# Patient Record
Sex: FEMALE | Race: WHITE | NOT HISPANIC OR LATINO | Employment: FULL TIME | URBAN - METROPOLITAN AREA
[De-identification: names, ages, dates, MRNs, and addresses within clinical notes are randomized per-mention and may not be internally consistent; named-entity substitution may affect disease eponyms.]

---

## 2017-07-22 LAB
AMPHETAMINES UR QL: NEGATIVE
BACTERIA SPEC AEROBE CULT: ABNORMAL
BUPRENORPHINE SERPL-MCNC: NEGATIVE NG/ML
CANNABINOIDS SERPL QL: NEGATIVE
COCAINE SERPL CFM-MCNC: NORMAL NG/ML
EXTERNAL ABO GROUPING: (no result)
EXTERNAL AMPHETAMINE SCREEN URINE: NEGATIVE
EXTERNAL ANTIBODY SCREEN: NORMAL
EXTERNAL BARBITURATE SCREEN URINE: NORMAL
EXTERNAL BENZODIAZEPINE SCREEN URINE: NEGATIVE
EXTERNAL HEMATOCRIT: 39 %
EXTERNAL HEMOGLOBIN: 12.8 G/DL
EXTERNAL HEPATITIS B SURFACE ANTIGEN: NEGATIVE
EXTERNAL METHADONE SCREEN URINE: NEGATIVE
EXTERNAL PHENCYCLIDINE SCREEN URINE: NORMAL
EXTERNAL PLATELET COUNT: 273 K/ΜL
EXTERNAL PROPOXYPHENE SCREEN URINE: NORMAL
EXTERNAL RH FACTOR: POSITIVE
EXTERNAL SYPHILIS RPR SCREEN: (no result)
GP B STREP RRNA SPEC QL PROBE: NORMAL
HIV 1+2 AB+HIV1 P24 AG SERPL QL IA: NORMAL
OPIATES UR QL: NEGATIVE
OXYCODONE UR QL SCN: NEGATIVE
RUBV IGG SERPL IA-ACNC: (no result)
TRICYCLICS UR QL SCN: NEGATIVE

## 2017-09-23 PROBLEM — Z01.419 WELL WOMAN EXAM: Status: ACTIVE | Noted: 2017-09-23

## 2017-09-28 ENCOUNTER — INITIAL PRENATAL (OUTPATIENT)
Dept: OBSTETRICS AND GYNECOLOGY | Facility: CLINIC | Age: 22
End: 2017-09-28

## 2017-09-28 VITALS — DIASTOLIC BLOOD PRESSURE: 72 MMHG | SYSTOLIC BLOOD PRESSURE: 108 MMHG | BODY MASS INDEX: 34.48 KG/M2 | WEIGHT: 204 LBS

## 2017-09-28 DIAGNOSIS — O09.92 HIGH-RISK PREGNANCY, SECOND TRIMESTER: Primary | ICD-10-CM

## 2017-09-28 DIAGNOSIS — O34.219 PREVIOUS CESAREAN SECTION COMPLICATING PREGNANCY: ICD-10-CM

## 2017-09-28 PROBLEM — Z87.761 HISTORY OF GASTROSCHISIS: Status: ACTIVE | Noted: 2017-09-28

## 2017-09-28 PROBLEM — O09.90 HIGH-RISK PREGNANCY: Status: ACTIVE | Noted: 2017-09-28

## 2017-09-28 PROCEDURE — 99214 OFFICE O/P EST MOD 30 MIN: CPT | Performed by: OBSTETRICS & GYNECOLOGY

## 2017-09-28 RX ORDER — PRENATAL VIT/IRON FUM/FOLIC AC 27MG-0.8MG
TABLET ORAL DAILY
COMMUNITY

## 2017-09-28 NOTE — PROGRESS NOTES
"Subjective   Chief Complaint   Patient presents with   • Initial Prenatal Visit       Lyssa Javier is a 21 y.o. year old .  Patient's last menstrual period was 2017 (approximate).  She presents to be seen to initiate prenatal care.     Smoking status: Never Smoker                                                              Smokeless status: Never Used                          The following portions of the patient's history were reviewed and updated as appropriate:{history reviewed:76519::\"vital signs\",\"allergies\",\"current medications\",\"past medical history\",\"past social history\",\"past surgical history\",\"problem list\"}.    Objective   Lab Review   {Review - gyn labs:88614::\"No data reviewed\"}    Imaging   {Review - imagin::\"No data reviewed\"}    Assessment/Plan   ASSESSMENT  1. 21 y.o. year old  at ***  2. {Pregnancy Imp:95367::\"Supervision of low risk pregnancy\"}    PLAN  1. The problem list for pregnancy was initiated today  2. Tests ordered today:  No orders of the defined types were placed in this encounter.    3. Testing for GC / Chlamydia / trichomonas {Actions; was done:15986::\"will need to be done at her next prenatal visit\"}  4. Genetic testing reviewed: {Genetic testing options:74314}  5. Information reviewed: {Pregnancy - first PN visit checklist:58195::\"exercise in pregnancy\",\"nutrition in pregnancy\",\"weight gain in pregnancy\",\"work and travel restrictions during pregnancy\",\"list of OTC medications acceptable in pregnancy\",\"call coverage groups\"}    Total time spent today with Lyssa  was {Time spent:36129}.  Off this time, {Percentage:96379::\"> 50%\"} was spent face-to-face time coordinating care, answering her questions and counseling regarding {Counseling topics:00387::\"pathophysiology of her presenting problem along with plans for any diagnositc work-up and treatment\"}.    Follow up: {numbers 1-12:} {DAYS, WEEKS, MONTHS, YEARS:24869}       This note was " electronically signed.    Roe Pollard MD  September 28, 2017

## 2017-09-28 NOTE — PROGRESS NOTES
Chief Complaint   Patient presents with   • Initial Prenatal Visit     Transferring care from [facility].  Just wasn't happy with the care she was receiving care.  Ultrasound has been done.  Labs of also been done.  Records are not available currently.  NT screening has been offered but was not able to be performed due to fetal position    HPI: Lyssa is a  currently at 17w6d who today reports the following:  Contractions - No; Leaking - No; Vaginal bleeding -  No; Heartburn - No.    ROS:  GI: Nausea - No ; Constipation - No; Diarrhea - No    Neuro: Headache - No ; Visual change - No      EXAM:  Vitals: See prenatal flowsheet   Abdomen: See prenatal flowsheet   Urine glucose/protein: See prenatal flowsheet   Pelvic: See prenatal flowsheet     No results found for: ABORH, ABO, RH, LABANTI, ABSCRN    MDM:  Impression: 1. Supervision of high risk pregnancy  2. Previous C/S with route of delivery to be determined  3. Prior gastroschisis   Tests done today: 1. MSAFP4 reviewed   Topics discussed: 1. Continue with PNV's  2. Prenatal labs reviewed  3. Need to get records from prior  along with prenatal records from current pregnancy   Tests scheduled today for her next visit:   Ultrasound for screening at    Next visit: See prenatal flowsheet

## 2017-10-23 ENCOUNTER — OFFICE VISIT (OUTPATIENT)
Dept: OBSTETRICS AND GYNECOLOGY | Facility: HOSPITAL | Age: 22
End: 2017-10-23

## 2017-10-23 ENCOUNTER — ROUTINE PRENATAL (OUTPATIENT)
Dept: OBSTETRICS AND GYNECOLOGY | Facility: CLINIC | Age: 22
End: 2017-10-23

## 2017-10-23 ENCOUNTER — HOSPITAL ENCOUNTER (OUTPATIENT)
Dept: WOMENS IMAGING | Facility: HOSPITAL | Age: 22
Discharge: HOME | End: 2017-10-23
Attending: OBSTETRICS & GYNECOLOGY | Admitting: OBSTETRICS & GYNECOLOGY

## 2017-10-23 VITALS — SYSTOLIC BLOOD PRESSURE: 128 MMHG | BODY MASS INDEX: 35.49 KG/M2 | WEIGHT: 210 LBS | DIASTOLIC BLOOD PRESSURE: 78 MMHG

## 2017-10-23 VITALS — WEIGHT: 209 LBS | DIASTOLIC BLOOD PRESSURE: 66 MMHG | BODY MASS INDEX: 35.32 KG/M2 | SYSTOLIC BLOOD PRESSURE: 132 MMHG

## 2017-10-23 DIAGNOSIS — O44.42 LOW LYING PLACENTA NOS OR WITHOUT HEMORRHAGE, SECOND TRIMESTER: ICD-10-CM

## 2017-10-23 DIAGNOSIS — O34.219 PREVIOUS CESAREAN SECTION COMPLICATING PREGNANCY: ICD-10-CM

## 2017-10-23 DIAGNOSIS — O09.92 HIGH-RISK PREGNANCY IN SECOND TRIMESTER: ICD-10-CM

## 2017-10-23 DIAGNOSIS — Z87.761 HISTORY OF GASTROSCHISIS: ICD-10-CM

## 2017-10-23 DIAGNOSIS — O09.92 HIGH-RISK PREGNANCY, SECOND TRIMESTER: ICD-10-CM

## 2017-10-23 DIAGNOSIS — O09.92 HIGH-RISK PREGNANCY IN SECOND TRIMESTER: Primary | ICD-10-CM

## 2017-10-23 PROCEDURE — 76811 OB US DETAILED SNGL FETUS: CPT

## 2017-10-23 PROCEDURE — 99213 OFFICE O/P EST LOW 20 MIN: CPT | Performed by: OBSTETRICS & GYNECOLOGY

## 2017-10-23 PROCEDURE — 76811 OB US DETAILED SNGL FETUS: CPT | Performed by: OBSTETRICS & GYNECOLOGY

## 2017-10-23 NOTE — PROGRESS NOTES
Previous child with gastroschisis, delivered at .  To see Dr. Pollard today after PDC.  No NIPT done.

## 2017-10-23 NOTE — PROGRESS NOTES
Chief Complaint   Patient presents with   • Routine Prenatal Visit       HPI: Lyssa is a  currently at 21w3d who today reports the following:  Contractions - No; Leaking - No; Vaginal bleeding -  No; Heartburn - No.  Records from [facility] still not here    ROS:  GI: Nausea - No ; Constipation - No; Diarrhea - No    Neuro: Headache - No ; Visual change - No      EXAM:  Vitals: See prenatal flowsheet   Abdomen: See prenatal flowsheet   Urine glucose/protein: See prenatal flowsheet   Pelvic: See prenatal flowsheet     No results found for: ABORH, ABO, RH, LABANTI, ABSCRN    MDM:  Impression: 1. Supervision of high risk pregnancy  2. Previous C/S with route of delivery to be determined  3. Low-lying placenta   Tests done today: 1. U/S for anatomic screening - at [facility]   Topics discussed: 1. Continue with PNV's  2. Prenatal labs reviewed  3. low-lying placenta   Tests scheduled today for her next visit:   none   Next visit: See prenatal flowsheet

## 2017-10-23 NOTE — PROGRESS NOTES
Documentation of the ultrasound findings, images, and interpretations will be available in the patient's ViewPoint report located in the chart review imaging tab in [facility].

## 2017-11-09 PROBLEM — R82.71 GROUP B STREPTOCOCCAL BACTERIURIA: Status: ACTIVE | Noted: 2017-11-09

## 2017-11-10 ENCOUNTER — DOCUMENTATION (OUTPATIENT)
Dept: OBSTETRICS AND GYNECOLOGY | Facility: CLINIC | Age: 22
End: 2017-11-10

## 2017-11-10 NOTE — PROGRESS NOTES
Operative note from [facility] dated 2001 reviewed.  She had a low transverse  section with 2 layer closure.

## 2017-11-20 ENCOUNTER — ROUTINE PRENATAL (OUTPATIENT)
Dept: OBSTETRICS AND GYNECOLOGY | Facility: CLINIC | Age: 22
End: 2017-11-20

## 2017-11-20 VITALS — WEIGHT: 211 LBS | BODY MASS INDEX: 35.66 KG/M2 | DIASTOLIC BLOOD PRESSURE: 74 MMHG | SYSTOLIC BLOOD PRESSURE: 122 MMHG

## 2017-11-20 DIAGNOSIS — R82.71 GROUP B STREPTOCOCCAL BACTERIURIA: ICD-10-CM

## 2017-11-20 DIAGNOSIS — O44.42 LOW LYING PLACENTA NOS OR WITHOUT HEMORRHAGE, SECOND TRIMESTER: ICD-10-CM

## 2017-11-20 DIAGNOSIS — O34.219 PREVIOUS CESAREAN SECTION COMPLICATING PREGNANCY: ICD-10-CM

## 2017-11-20 DIAGNOSIS — O09.92 HIGH-RISK PREGNANCY IN SECOND TRIMESTER: Primary | ICD-10-CM

## 2017-11-20 PROBLEM — Z30.2 REQUEST FOR STERILIZATION: Status: ACTIVE | Noted: 2017-11-20

## 2017-11-20 PROCEDURE — 99213 OFFICE O/P EST LOW 20 MIN: CPT | Performed by: OBSTETRICS & GYNECOLOGY

## 2017-11-20 RX ORDER — PROMETHAZINE HYDROCHLORIDE 25 MG/ML
12.5 INJECTION, SOLUTION INTRAMUSCULAR; INTRAVENOUS EVERY 6 HOURS PRN
Status: CANCELLED | OUTPATIENT
Start: 2017-11-20

## 2017-11-20 RX ORDER — SODIUM CHLORIDE 0.9 % (FLUSH) 0.9 %
1-10 SYRINGE (ML) INJECTION AS NEEDED
Status: CANCELLED | OUTPATIENT
Start: 2017-11-20

## 2017-11-20 RX ORDER — TRISODIUM CITRATE DIHYDRATE AND CITRIC ACID MONOHYDRATE 500; 334 MG/5ML; MG/5ML
30 SOLUTION ORAL ONCE
Status: CANCELLED | OUTPATIENT
Start: 2017-11-20 | End: 2017-11-20

## 2017-11-20 RX ORDER — MISOPROSTOL 100 UG/1
800 TABLET ORAL AS NEEDED
Status: CANCELLED | OUTPATIENT
Start: 2017-11-20

## 2017-11-20 RX ORDER — SODIUM CHLORIDE, SODIUM LACTATE, POTASSIUM CHLORIDE, CALCIUM CHLORIDE 600; 310; 30; 20 MG/100ML; MG/100ML; MG/100ML; MG/100ML
125 INJECTION, SOLUTION INTRAVENOUS CONTINUOUS
Status: CANCELLED | OUTPATIENT
Start: 2017-11-20

## 2017-11-20 RX ORDER — PROMETHAZINE HYDROCHLORIDE 25 MG/1
12.5 TABLET ORAL EVERY 6 HOURS PRN
Status: CANCELLED | OUTPATIENT
Start: 2017-11-20

## 2017-11-20 RX ORDER — PROMETHAZINE HYDROCHLORIDE 25 MG/1
12.5 SUPPOSITORY RECTAL EVERY 6 HOURS PRN
Status: CANCELLED | OUTPATIENT
Start: 2017-11-20

## 2017-11-20 RX ORDER — METHYLERGONOVINE MALEATE 0.2 MG/ML
200 INJECTION INTRAVENOUS ONCE AS NEEDED
Status: CANCELLED | OUTPATIENT
Start: 2017-11-20

## 2017-11-20 RX ORDER — CARBOPROST TROMETHAMINE 250 UG/ML
250 INJECTION, SOLUTION INTRAMUSCULAR AS NEEDED
Status: CANCELLED | OUTPATIENT
Start: 2017-11-20

## 2017-11-20 RX ORDER — LIDOCAINE HYDROCHLORIDE 10 MG/ML
5 INJECTION, SOLUTION INFILTRATION; PERINEURAL AS NEEDED
Status: CANCELLED | OUTPATIENT
Start: 2017-11-20

## 2017-11-20 NOTE — PROGRESS NOTES
Chief Complaint   Patient presents with   • Routine Prenatal Visit       HPI: Lyssa is a  currently at 25w3d who today reports the following:  Contractions - No; Leaking - No; Vaginal bleeding -  No; Heartburn - No.  She has considered delivery options and wants to deliver by repeat C/S    ROS:  GI: Nausea - No ; Constipation - No; Diarrhea - No    Neuro: Headache - No ; Visual change - No      EXAM:  Vitals: See prenatal flowsheet   Abdomen: See prenatal flowsheet   Urine glucose/protein: See prenatal flowsheet   Pelvic: See prenatal flowsheet     Lab Results   Component Value Date    ABO O 2017    RH Positive 2017    ABSCRN Normal 2017       MDM:  Impression: 1. Supervision of high risk pregnancy  2. Previous C/S with planned repeat C/S  3. Low-lying placenta   Tests done today: 1. none   Topics discussed: 1. Continue with PNV's  2. Prenatal labs reviewed  3. flu vaccine   4. Call for C/S date 39-41 weeks   Tests scheduled today for her next visit:   GCT  HgB   Next visit: See prenatal flowsheet

## 2017-11-27 ENCOUNTER — DOCUMENTATION (OUTPATIENT)
Dept: OBSTETRICS AND GYNECOLOGY | Facility: CLINIC | Age: 22
End: 2017-11-27

## 2017-12-11 ENCOUNTER — TELEPHONE (OUTPATIENT)
Dept: OBSTETRICS AND GYNECOLOGY | Facility: CLINIC | Age: 22
End: 2017-12-11

## 2017-12-11 ENCOUNTER — ROUTINE PRENATAL (OUTPATIENT)
Dept: OBSTETRICS AND GYNECOLOGY | Facility: CLINIC | Age: 22
End: 2017-12-11

## 2017-12-11 ENCOUNTER — APPOINTMENT (OUTPATIENT)
Dept: LAB | Facility: HOSPITAL | Age: 22
End: 2017-12-11

## 2017-12-11 VITALS — SYSTOLIC BLOOD PRESSURE: 118 MMHG | WEIGHT: 211 LBS | BODY MASS INDEX: 35.66 KG/M2 | DIASTOLIC BLOOD PRESSURE: 74 MMHG

## 2017-12-11 DIAGNOSIS — O44.42 LOW LYING PLACENTA NOS OR WITHOUT HEMORRHAGE, SECOND TRIMESTER: ICD-10-CM

## 2017-12-11 DIAGNOSIS — O34.219 PREVIOUS CESAREAN SECTION COMPLICATING PREGNANCY: ICD-10-CM

## 2017-12-11 DIAGNOSIS — O99.810 ABNORMAL O'SULLIVAN GLUCOSE CHALLENGE TEST, ANTEPARTUM: Primary | ICD-10-CM

## 2017-12-11 DIAGNOSIS — Z30.2 REQUEST FOR STERILIZATION: ICD-10-CM

## 2017-12-11 DIAGNOSIS — R82.71 GROUP B STREPTOCOCCAL BACTERIURIA: ICD-10-CM

## 2017-12-11 DIAGNOSIS — O09.92 HIGH-RISK PREGNANCY IN SECOND TRIMESTER: ICD-10-CM

## 2017-12-11 LAB
GLUCOSE 1H P 100 G GLC PO SERPL-MCNC: 155 MG/DL (ref 65–199)
HCT VFR BLD AUTO: 36.4 % (ref 34.5–44)
HGB BLD-MCNC: 12.1 G/DL (ref 11.5–15.5)

## 2017-12-11 PROCEDURE — 85018 HEMOGLOBIN: CPT | Performed by: OBSTETRICS & GYNECOLOGY

## 2017-12-11 PROCEDURE — 82950 GLUCOSE TEST: CPT | Performed by: OBSTETRICS & GYNECOLOGY

## 2017-12-11 PROCEDURE — 85014 HEMATOCRIT: CPT | Performed by: OBSTETRICS & GYNECOLOGY

## 2017-12-11 PROCEDURE — 36415 COLL VENOUS BLD VENIPUNCTURE: CPT | Performed by: OBSTETRICS & GYNECOLOGY

## 2017-12-11 PROCEDURE — 99213 OFFICE O/P EST LOW 20 MIN: CPT | Performed by: OBSTETRICS & GYNECOLOGY

## 2017-12-11 NOTE — PROGRESS NOTES
Chief Complaint   Patient presents with   • Routine Prenatal Visit       HPI: Lyssa is a  currently at 28w3d who today reports the following:  Contractions - No; Leaking - No; Vaginal bleeding -  No; Heartburn - No.  Also she's having intermittent low back pain.  It is not impacting her at this point.    ROS:  GI: Nausea - No ; Constipation - No; Diarrhea - No    Neuro: Headache - No ; Visual change - No      EXAM:  Vitals: See prenatal flowsheet   Abdomen: See prenatal flowsheet   Urine glucose/protein: See prenatal flowsheet   Pelvic: See prenatal flowsheet     Lab Results   Component Value Date    ABO O 2017    RH Positive 2017    ABSCRN Normal 2017       MDM:  Impression: 1. Supervision of high risk pregnancy  2. Previous C/S with planned repeat C/S  3. Low-lying placenta   4. Lumbago   Tests done today: 1. GCT  2. HgB   Topics discussed: 1. Continue with PNV's  2. Prenatal labs reviewed  3. Exercises for low back pain in pregnancy   Tests scheduled today for her next visit:   none   Next visit: See prenatal flowsheet     Fetal movement counting was discussed.  I explained to Lyssa that if she is noticing diminished quantity of fetal movement, she should eat or drink something and lay down on her side for 30 minutes.  So long as there are 3 movements in the 30 minute window, fetal movement is normal.  She should do this twice daily if fetal movement is perceived to be diminished.  If ever she gets less than 3 movements in a 30 minute window, she is to call the office immediately for further fetal testing.

## 2017-12-13 ENCOUNTER — RESULTS ENCOUNTER (OUTPATIENT)
Dept: OBSTETRICS AND GYNECOLOGY | Facility: CLINIC | Age: 22
End: 2017-12-13

## 2017-12-13 DIAGNOSIS — O09.92 HIGH-RISK PREGNANCY IN SECOND TRIMESTER: ICD-10-CM

## 2017-12-27 ENCOUNTER — LAB (OUTPATIENT)
Dept: LAB | Facility: HOSPITAL | Age: 22
End: 2017-12-27

## 2017-12-27 DIAGNOSIS — O99.810 ABNORMAL O'SULLIVAN GLUCOSE CHALLENGE TEST, ANTEPARTUM: ICD-10-CM

## 2017-12-27 DIAGNOSIS — O99.810 ABNORMAL MATERNAL GLUCOSE TOLERANCE, ANTEPARTUM: Primary | ICD-10-CM

## 2017-12-27 DIAGNOSIS — O24.410 DIET CONTROLLED GESTATIONAL DIABETES MELLITUS (GDM) IN THIRD TRIMESTER: Primary | ICD-10-CM

## 2017-12-27 LAB
GLUCOSE BLDC GLUCOMTR-MCNC: 120 MG/DL
GLUCOSE BLDC-MCNC: 94 MG/DL (ref 75–125)
GLUCOSE P FAST SERPL-MCNC: 93 MG/DL (ref 65–95)
GTT GEST 2H PNL UR+SERPL: 198 MG/DL (ref 65–180)
GTT GEST 3H PNL SERPL: 115 MG/DL (ref 65–140)
GTT GEST 3H PNL SERPL: 162 MG/DL (ref 65–155)

## 2017-12-27 PROCEDURE — 36415 COLL VENOUS BLD VENIPUNCTURE: CPT

## 2017-12-27 PROCEDURE — 82962 GLUCOSE BLOOD TEST: CPT

## 2017-12-27 PROCEDURE — 82952 GTT-ADDED SAMPLES: CPT

## 2017-12-27 PROCEDURE — 82951 GLUCOSE TOLERANCE TEST (GTT): CPT

## 2017-12-28 ENCOUNTER — TELEPHONE (OUTPATIENT)
Dept: OBSTETRICS AND GYNECOLOGY | Facility: CLINIC | Age: 22
End: 2017-12-28

## 2017-12-28 NOTE — TELEPHONE ENCOUNTER
Diabetes edu called to clarify diabetes order. Originally order was written just for Nutrition, If so, then she would only see dietitian. If you want her to see the nurse as well, a new order has to be written for diabetes services and sent over.

## 2017-12-29 ENCOUNTER — ROUTINE PRENATAL (OUTPATIENT)
Dept: OBSTETRICS AND GYNECOLOGY | Facility: CLINIC | Age: 22
End: 2017-12-29

## 2017-12-29 VITALS — BODY MASS INDEX: 35.66 KG/M2 | WEIGHT: 211 LBS | SYSTOLIC BLOOD PRESSURE: 116 MMHG | DIASTOLIC BLOOD PRESSURE: 72 MMHG

## 2017-12-29 DIAGNOSIS — O24.419 GESTATIONAL DIABETES MELLITUS (GDM) AFFECTING PREGNANCY: Primary | ICD-10-CM

## 2017-12-29 DIAGNOSIS — O09.93 HIGH-RISK PREGNANCY IN THIRD TRIMESTER: Primary | ICD-10-CM

## 2017-12-29 DIAGNOSIS — O24.415 GESTATIONAL DIABETES MELLITUS (GDM) IN SECOND TRIMESTER CONTROLLED ON ORAL HYPOGLYCEMIC DRUG: ICD-10-CM

## 2017-12-29 DIAGNOSIS — Z30.2 REQUEST FOR STERILIZATION: ICD-10-CM

## 2017-12-29 DIAGNOSIS — R82.71 GROUP B STREPTOCOCCAL BACTERIURIA: ICD-10-CM

## 2017-12-29 DIAGNOSIS — O44.42 LOW LYING PLACENTA NOS OR WITHOUT HEMORRHAGE, SECOND TRIMESTER: ICD-10-CM

## 2017-12-29 DIAGNOSIS — O24.414 INSULIN CONTROLLED GESTATIONAL DIABETES MELLITUS (GDM) IN SECOND TRIMESTER: Primary | ICD-10-CM

## 2017-12-29 DIAGNOSIS — O34.219 PREVIOUS CESAREAN SECTION COMPLICATING PREGNANCY: ICD-10-CM

## 2017-12-29 DIAGNOSIS — O24.410 DIET CONTROLLED GESTATIONAL DIABETES MELLITUS (GDM) IN THIRD TRIMESTER: ICD-10-CM

## 2017-12-29 PROCEDURE — 99213 OFFICE O/P EST LOW 20 MIN: CPT | Performed by: OBSTETRICS & GYNECOLOGY

## 2017-12-29 NOTE — PROGRESS NOTES
Chief Complaint   Patient presents with   • Routine Prenatal Visit       HPI: Lyssa is a  currently at 31w0d who today reports the following:  Contractions - No; Leaking - No; Vaginal bleeding -  No; Swelling of extremities - No.    ROS:  GI: Nausea - No; Constipation - No; Diarrhea - No    Neuro: Headache - No; Visual change - No      EXAM:  Vitals: See prenatal flowsheet   Abdomen: See prenatal flowsheet   Urine glucose/protein: See prenatal flowsheet   Pelvic: See prenatal flowsheet   MDM:   Impression: 1. Supervision of high risk pregnancy  2. Previous C/S with planned repeat C/S  3. Glucose intolerance   Tests done today: 1. none   Topics discussed: 1. Continue with PNV's  2. Prenatal labs reviewed  3. Need for dietician   Tests scheduled today for her next visit:   U/S for placenta recheck   Next visit: See prenatal flowsheet

## 2018-01-03 DIAGNOSIS — O41.03X0 OLIGOHYDRAMNIOS IN THIRD TRIMESTER, SINGLE OR UNSPECIFIED FETUS: Primary | ICD-10-CM

## 2018-01-03 PROBLEM — O44.42 LOW LYING PLACENTA NOS OR WITHOUT HEMORRHAGE, SECOND TRIMESTER: Status: RESOLVED | Noted: 2017-10-23 | Resolved: 2018-01-03

## 2018-01-12 ENCOUNTER — ROUTINE PRENATAL (OUTPATIENT)
Dept: OBSTETRICS AND GYNECOLOGY | Facility: CLINIC | Age: 23
End: 2018-01-12

## 2018-01-12 VITALS — BODY MASS INDEX: 36.5 KG/M2 | WEIGHT: 216 LBS | SYSTOLIC BLOOD PRESSURE: 114 MMHG | DIASTOLIC BLOOD PRESSURE: 72 MMHG

## 2018-01-12 DIAGNOSIS — O09.93 HIGH-RISK PREGNANCY IN THIRD TRIMESTER: ICD-10-CM

## 2018-01-12 DIAGNOSIS — R82.71 GROUP B STREPTOCOCCAL BACTERIURIA: ICD-10-CM

## 2018-01-12 DIAGNOSIS — O24.410 DIET CONTROLLED GESTATIONAL DIABETES MELLITUS (GDM) IN THIRD TRIMESTER: ICD-10-CM

## 2018-01-12 DIAGNOSIS — O34.219 PREVIOUS CESAREAN SECTION COMPLICATING PREGNANCY: ICD-10-CM

## 2018-01-12 DIAGNOSIS — Z30.2 REQUEST FOR STERILIZATION: ICD-10-CM

## 2018-01-12 PROBLEM — O41.03X0 OLIGOHYDRAMNIOS IN THIRD TRIMESTER: Status: RESOLVED | Noted: 2018-01-03 | Resolved: 2018-01-12

## 2018-01-12 PROCEDURE — 99213 OFFICE O/P EST LOW 20 MIN: CPT | Performed by: OBSTETRICS & GYNECOLOGY

## 2018-01-12 NOTE — PROGRESS NOTES
Chief Complaint   Patient presents with   • Routine Prenatal Visit       HPI: Lyssa is a  currently at 33w0d who today reports the following:  Contractions - No; Leaking - No; Vaginal bleeding -  No; Swelling of extremities - No.    ROS:  GI: Nausea - No; Constipation - No; Diarrhea - No    Neuro: Headache - No; Visual change - No      EXAM:  Vitals: See prenatal flowsheet   Abdomen: See prenatal flowsheet   Urine glucose/protein: See prenatal flowsheet   Pelvic: See prenatal flowsheet   MDM:   Impression: 1. Supervision of high risk pregnancy  2. Previous C/S with planned repeat C/S  3. Oligohydramnios - resolved   Tests done today: 1. U/S for JUDSON only - 10.1   Topics discussed: 1. Continue with PNV's  2. Prenatal labs reviewed  3. none   Tests scheduled today for her next visit:   none   Next visit: See prenatal flowsheet

## 2018-01-26 ENCOUNTER — ROUTINE PRENATAL (OUTPATIENT)
Dept: OBSTETRICS AND GYNECOLOGY | Facility: CLINIC | Age: 23
End: 2018-01-26

## 2018-01-26 VITALS — SYSTOLIC BLOOD PRESSURE: 116 MMHG | DIASTOLIC BLOOD PRESSURE: 74 MMHG | BODY MASS INDEX: 36 KG/M2 | WEIGHT: 213 LBS

## 2018-01-26 DIAGNOSIS — O24.410 DIET CONTROLLED GESTATIONAL DIABETES MELLITUS (GDM) IN THIRD TRIMESTER: ICD-10-CM

## 2018-01-26 DIAGNOSIS — Z30.2 REQUEST FOR STERILIZATION: ICD-10-CM

## 2018-01-26 DIAGNOSIS — O34.219 PREVIOUS CESAREAN SECTION COMPLICATING PREGNANCY: ICD-10-CM

## 2018-01-26 DIAGNOSIS — O09.93 HIGH-RISK PREGNANCY IN THIRD TRIMESTER: ICD-10-CM

## 2018-01-26 DIAGNOSIS — R82.71 GROUP B STREPTOCOCCAL BACTERIURIA: ICD-10-CM

## 2018-01-26 PROCEDURE — 99213 OFFICE O/P EST LOW 20 MIN: CPT | Performed by: OBSTETRICS & GYNECOLOGY

## 2018-01-26 NOTE — PROGRESS NOTES
Chief Complaint   Patient presents with   • Routine Prenatal Visit       HPI: Lyssa is a  currently at 35w0d who today reports the following:  Contractions - No; Leaking - No; Vaginal bleeding -  No; Swelling of extremities - No.    ROS:  GI: Nausea - No; Constipation - No; Diarrhea - No    Neuro: Headache - No; Visual change - No      EXAM:  Vitals: See prenatal flowsheet   Abdomen: See prenatal flowsheet   Urine glucose/protein: See prenatal flowsheet   Pelvic: See prenatal flowsheet   MDM:   Impression: 1. Supervision of high risk pregnancy  2. DM - GDMA1  3. GBS (+)  4. Previous C/S with planned repeat C/S   Tests done today: 1. none   Topics discussed: 1. Continue with PNV's  2. Prenatal labs reviewed  3. none - she had no major complaints,questions or concerns   Tests scheduled today for her next visit:   U/S for EFW   Next visit: See prenatal flowsheet

## 2018-01-31 ENCOUNTER — ROUTINE PRENATAL (OUTPATIENT)
Dept: OBSTETRICS AND GYNECOLOGY | Facility: CLINIC | Age: 23
End: 2018-01-31

## 2018-01-31 VITALS — DIASTOLIC BLOOD PRESSURE: 74 MMHG | SYSTOLIC BLOOD PRESSURE: 118 MMHG | WEIGHT: 219 LBS | BODY MASS INDEX: 37.01 KG/M2

## 2018-01-31 DIAGNOSIS — O41.03X0 OLIGOHYDRAMNIOS IN THIRD TRIMESTER, SINGLE OR UNSPECIFIED FETUS: ICD-10-CM

## 2018-01-31 DIAGNOSIS — O09.93 HIGH-RISK PREGNANCY IN THIRD TRIMESTER: Primary | ICD-10-CM

## 2018-01-31 DIAGNOSIS — R82.71 GROUP B STREPTOCOCCAL BACTERIURIA: ICD-10-CM

## 2018-01-31 DIAGNOSIS — O24.410 DIET CONTROLLED GESTATIONAL DIABETES MELLITUS (GDM) IN THIRD TRIMESTER: ICD-10-CM

## 2018-01-31 DIAGNOSIS — O34.219 PREVIOUS CESAREAN SECTION COMPLICATING PREGNANCY: ICD-10-CM

## 2018-01-31 DIAGNOSIS — Z30.2 REQUEST FOR STERILIZATION: ICD-10-CM

## 2018-01-31 PROCEDURE — 99213 OFFICE O/P EST LOW 20 MIN: CPT | Performed by: OBSTETRICS & GYNECOLOGY

## 2018-01-31 NOTE — PROGRESS NOTES
Chief Complaint   Patient presents with   • Routine Prenatal Visit       HPI: Lyssa is a  currently at 35w5d who today reports the following:  Contractions - No; Leaking - No; Vaginal bleeding -  No; Swelling of extremities - No.    ROS:  GI: Nausea - No; Constipation - No; Diarrhea - No    Neuro: Headache - No; Visual change - No      EXAM:  Vitals: See prenatal flowsheet   Abdomen: See prenatal flowsheet   Urine glucose/protein: See prenatal flowsheet   Pelvic: See prenatal flowsheet   MDM:   Impression: 1. Supervision of high risk pregnancy  2. DM - GDMA1  3. Previous C/S with planned repeat C/S  4. Borderline low JUDSON (but normal MVP)   Tests done today: 1. U/S for EFW - 16%'ile   Topics discussed: 1. Continue with PNV's  2. Prenatal labs reviewed  3. Forced fluids and recheck JUDSON in 48 hours   Tests scheduled today for her next visit:   U/S for JUDSON only   Next visit: See prenatal flowsheet

## 2018-02-02 ENCOUNTER — ROUTINE PRENATAL (OUTPATIENT)
Dept: OBSTETRICS AND GYNECOLOGY | Facility: CLINIC | Age: 23
End: 2018-02-02

## 2018-02-02 VITALS — DIASTOLIC BLOOD PRESSURE: 70 MMHG | SYSTOLIC BLOOD PRESSURE: 116 MMHG | WEIGHT: 219 LBS | BODY MASS INDEX: 37.01 KG/M2

## 2018-02-02 DIAGNOSIS — O34.219 PREVIOUS CESAREAN SECTION COMPLICATING PREGNANCY: ICD-10-CM

## 2018-02-02 DIAGNOSIS — O41.03X0 OLIGOHYDRAMNIOS IN THIRD TRIMESTER, SINGLE OR UNSPECIFIED FETUS: Primary | ICD-10-CM

## 2018-02-02 PROCEDURE — 99214 OFFICE O/P EST MOD 30 MIN: CPT | Performed by: OBSTETRICS & GYNECOLOGY

## 2018-02-02 PROCEDURE — 59025 FETAL NON-STRESS TEST: CPT | Performed by: OBSTETRICS & GYNECOLOGY

## 2018-02-02 NOTE — PROGRESS NOTES
Chief Complaint   Patient presents with   • Routine Prenatal Visit     nst today, judson done today       HPI: Lyssa is a  currently at 36w0d who today reports the following:She has oligohydramnios and a small for gestational age baby.  She is a repeat  scheduled for 39+ weeks.  Of note her first child weighed 5 lbs. 11 oz. at 36-2/7 weeks.  Estimated fetal weight 3 days ago was 5 lbs. 1 oz.  Very good fetal movement noted.  Contractions - No; Leaking - YES she thinks she may be leaking a bit at or more just moisture is noted.  No gush of fluid.  Vaginal bleeding -  No; Swelling of extremities - YES.    ROS:  GI: Nausea - No; Constipation - No; Diarrhea - No    Neuro: Headache - No; Visual change - No      EXAM:  Vitals: See prenatal flowsheet   Abdomen: See prenatal flowsheet   Urine glucose/protein: See prenatal flowsheet   Pelvic: Sterile speculum cervix is closed little bit of mucous at the os.  No Valsalva fluid pH normal white vaginal discharge    MDM:   Impression: 1. Supervision of high risk pregnancy  2. Previous C/S with planned repeat C/S  3. Oligohydramnios and small for gestational age   Tests done today: 1. NST - reactive  2. U/S for JUDSON only - JUDSON 4.5   Topics discussed: 1. Continue with PNV's  2. Prenatal labs reviewed  3. Fetal movement and kick count chart given   4. Probably delivery sooner than 39-1/2 weeks given smaller baby right now very reassuring NST despite low fluid constitutionally smaller child.  She is asked to continue to force fluids.  Any decreased fetal movement gush of fluid or labor she is to come to labor and delivery without calling me on call this weekend    Tests scheduled today for her next visit:   NST  U/S for JUDSON only with Dr. Pollard Monday or Tuesday    Next visit: See prenatal flowsheet

## 2018-02-03 ENCOUNTER — HOSPITAL ENCOUNTER (INPATIENT)
Facility: HOSPITAL | Age: 23
LOS: 4 days | Discharge: HOME | End: 2018-02-07
Attending: OBSTETRICS & GYNECOLOGY | Admitting: OBSTETRICS & GYNECOLOGY

## 2018-02-03 DIAGNOSIS — O41.03X0 OLIGOHYDRAMNIOS IN THIRD TRIMESTER, SINGLE OR UNSPECIFIED FETUS: ICD-10-CM

## 2018-02-03 DIAGNOSIS — Z3A.36 PREGNANCY WITH 36 COMPLETED WEEKS GESTATION: ICD-10-CM

## 2018-02-03 DIAGNOSIS — O34.219 PREVIOUS CESAREAN SECTION COMPLICATING PREGNANCY: ICD-10-CM

## 2018-02-03 DIAGNOSIS — Z98.890 POST-OPERATIVE STATE: ICD-10-CM

## 2018-02-03 DIAGNOSIS — Z30.2 REQUEST FOR STERILIZATION: ICD-10-CM

## 2018-02-03 DIAGNOSIS — O09.92 HIGH-RISK PREGNANCY IN SECOND TRIMESTER: ICD-10-CM

## 2018-02-03 DIAGNOSIS — O41.03X0 OLIGOHYDRAMNIOS ANTEPARTUM, THIRD TRIMESTER, NOT APPLICABLE OR UNSPECIFIED FETUS: ICD-10-CM

## 2018-02-03 LAB
ABO GROUP BLD: NORMAL
BLD GP AB SCN SERPL QL: NEGATIVE
DEPRECATED RDW RBC AUTO: 42.3 FL (ref 37–54)
ERYTHROCYTE [DISTWIDTH] IN BLOOD BY AUTOMATED COUNT: 13.6 % (ref 11.3–14.5)
HCT VFR BLD AUTO: 34.7 % (ref 34.5–44)
HGB BLD-MCNC: 11.5 G/DL (ref 11.5–15.5)
MCH RBC QN AUTO: 28.5 PG (ref 27–31)
MCHC RBC AUTO-ENTMCNC: 33.1 G/DL (ref 32–36)
MCV RBC AUTO: 85.9 FL (ref 80–99)
PLATELET # BLD AUTO: 245 10*3/MM3 (ref 150–450)
PMV BLD AUTO: 10.1 FL (ref 6–12)
RBC # BLD AUTO: 4.04 10*6/MM3 (ref 3.89–5.14)
RH BLD: POSITIVE
WBC NRBC COR # BLD: 9.89 10*3/MM3 (ref 3.5–10.8)

## 2018-02-03 PROCEDURE — 25010000002 BETAMETHASONE ACET & SOD PHOS PER 4 MG: Performed by: OBSTETRICS & GYNECOLOGY

## 2018-02-03 PROCEDURE — 86901 BLOOD TYPING SEROLOGIC RH(D): CPT | Performed by: OBSTETRICS & GYNECOLOGY

## 2018-02-03 PROCEDURE — 86900 BLOOD TYPING SEROLOGIC ABO: CPT | Performed by: OBSTETRICS & GYNECOLOGY

## 2018-02-03 PROCEDURE — 86850 RBC ANTIBODY SCREEN: CPT | Performed by: OBSTETRICS & GYNECOLOGY

## 2018-02-03 PROCEDURE — 85027 COMPLETE CBC AUTOMATED: CPT | Performed by: OBSTETRICS & GYNECOLOGY

## 2018-02-03 PROCEDURE — 59025 FETAL NON-STRESS TEST: CPT

## 2018-02-03 RX ORDER — NALOXONE HCL 0.4 MG/ML
0.4 VIAL (ML) INJECTION
Status: DISCONTINUED | OUTPATIENT
Start: 2018-02-03 | End: 2018-02-04 | Stop reason: HOSPADM

## 2018-02-03 RX ORDER — SODIUM CHLORIDE 0.9 % (FLUSH) 0.9 %
1-10 SYRINGE (ML) INJECTION AS NEEDED
Status: CANCELLED | OUTPATIENT
Start: 2018-02-03

## 2018-02-03 RX ORDER — DIPHENHYDRAMINE HYDROCHLORIDE 50 MG/ML
25 INJECTION INTRAMUSCULAR; INTRAVENOUS NIGHTLY PRN
Status: CANCELLED | OUTPATIENT
Start: 2018-02-03

## 2018-02-03 RX ORDER — LIDOCAINE HYDROCHLORIDE 10 MG/ML
5 INJECTION, SOLUTION INFILTRATION; PERINEURAL AS NEEDED
Status: CANCELLED | OUTPATIENT
Start: 2018-02-03

## 2018-02-03 RX ORDER — ACETAMINOPHEN 500 MG
1000 TABLET ORAL EVERY 4 HOURS PRN
Status: DISCONTINUED | OUTPATIENT
Start: 2018-02-03 | End: 2018-02-04 | Stop reason: HOSPADM

## 2018-02-03 RX ORDER — MISOPROSTOL 200 UG/1
800 TABLET ORAL AS NEEDED
Status: CANCELLED | OUTPATIENT
Start: 2018-02-03

## 2018-02-03 RX ORDER — ACETAMINOPHEN 650 MG/1
650 SUPPOSITORY RECTAL EVERY 4 HOURS PRN
Status: CANCELLED | OUTPATIENT
Start: 2018-02-03

## 2018-02-03 RX ORDER — METHYLERGONOVINE MALEATE 0.2 MG/ML
200 INJECTION INTRAVENOUS AS NEEDED
Status: CANCELLED | OUTPATIENT
Start: 2018-02-03

## 2018-02-03 RX ORDER — OXYTOCIN/RINGER'S LACTATE 20/1000 ML
125 PLASTIC BAG, INJECTION (ML) INTRAVENOUS CONTINUOUS PRN
Status: CANCELLED | OUTPATIENT
Start: 2018-02-03

## 2018-02-03 RX ORDER — BETAMETHASONE SODIUM PHOSPHATE AND BETAMETHASONE ACETATE 3; 3 MG/ML; MG/ML
12 INJECTION, SUSPENSION INTRA-ARTICULAR; INTRALESIONAL; INTRAMUSCULAR; SOFT TISSUE EVERY 12 HOURS
Status: COMPLETED | OUTPATIENT
Start: 2018-02-03 | End: 2018-02-03

## 2018-02-03 RX ORDER — LIDOCAINE HYDROCHLORIDE 10 MG/ML
5 INJECTION, SOLUTION EPIDURAL; INFILTRATION; INTRACAUDAL; PERINEURAL AS NEEDED
Status: DISCONTINUED | OUTPATIENT
Start: 2018-02-03 | End: 2018-02-04 | Stop reason: SDUPTHER

## 2018-02-03 RX ORDER — CARBOPROST TROMETHAMINE 250 UG/ML
250 INJECTION, SOLUTION INTRAMUSCULAR AS NEEDED
Status: CANCELLED | OUTPATIENT
Start: 2018-02-03

## 2018-02-03 RX ORDER — ZOLPIDEM TARTRATE 5 MG/1
5 TABLET ORAL NIGHTLY PRN
Status: DISCONTINUED | OUTPATIENT
Start: 2018-02-03 | End: 2018-02-04 | Stop reason: SDUPTHER

## 2018-02-03 RX ORDER — PROMETHAZINE HYDROCHLORIDE 25 MG/ML
12.5 INJECTION, SOLUTION INTRAMUSCULAR; INTRAVENOUS EVERY 6 HOURS PRN
Status: CANCELLED | OUTPATIENT
Start: 2018-02-03

## 2018-02-03 RX ORDER — HYDROCODONE BITARTRATE AND ACETAMINOPHEN 5; 325 MG/1; MG/1
1 TABLET ORAL EVERY 4 HOURS PRN
Status: CANCELLED | OUTPATIENT
Start: 2018-02-03 | End: 2018-02-13

## 2018-02-03 RX ORDER — SODIUM CHLORIDE 0.9 % (FLUSH) 0.9 %
1-10 SYRINGE (ML) INJECTION AS NEEDED
Status: DISCONTINUED | OUTPATIENT
Start: 2018-02-03 | End: 2018-02-04 | Stop reason: HOSPADM

## 2018-02-03 RX ORDER — OXYTOCIN/RINGER'S LACTATE 20/1000 ML
999 PLASTIC BAG, INJECTION (ML) INTRAVENOUS ONCE
Status: CANCELLED | OUTPATIENT
Start: 2018-02-03 | End: 2018-02-03

## 2018-02-03 RX ORDER — BETAMETHASONE SODIUM PHOSPHATE AND BETAMETHASONE ACETATE 3; 3 MG/ML; MG/ML
12 INJECTION, SUSPENSION INTRA-ARTICULAR; INTRALESIONAL; INTRAMUSCULAR; SOFT TISSUE EVERY 12 HOURS
Status: CANCELLED | OUTPATIENT
Start: 2018-02-03 | End: 2018-02-04

## 2018-02-03 RX ORDER — DIPHENHYDRAMINE HCL 25 MG
25 CAPSULE ORAL NIGHTLY PRN
Status: CANCELLED | OUTPATIENT
Start: 2018-02-03

## 2018-02-03 RX ORDER — PROMETHAZINE HYDROCHLORIDE 12.5 MG/1
12.5 SUPPOSITORY RECTAL EVERY 6 HOURS PRN
Status: CANCELLED | OUTPATIENT
Start: 2018-02-03

## 2018-02-03 RX ORDER — TRISODIUM CITRATE DIHYDRATE AND CITRIC ACID MONOHYDRATE 500; 334 MG/5ML; MG/5ML
30 SOLUTION ORAL ONCE
Status: CANCELLED | OUTPATIENT
Start: 2018-02-03 | End: 2018-02-03

## 2018-02-03 RX ORDER — PROMETHAZINE HYDROCHLORIDE 12.5 MG/1
12.5 TABLET ORAL EVERY 6 HOURS PRN
Status: CANCELLED | OUTPATIENT
Start: 2018-02-03

## 2018-02-03 RX ORDER — ONDANSETRON 2 MG/ML
4 INJECTION INTRAMUSCULAR; INTRAVENOUS EVERY 6 HOURS PRN
Status: CANCELLED | OUTPATIENT
Start: 2018-02-03

## 2018-02-03 RX ORDER — METHYLERGONOVINE MALEATE 0.2 MG/ML
200 INJECTION INTRAVENOUS ONCE AS NEEDED
Status: CANCELLED | OUTPATIENT
Start: 2018-02-03

## 2018-02-03 RX ORDER — ONDANSETRON 4 MG/1
4 TABLET, FILM COATED ORAL EVERY 6 HOURS PRN
Status: CANCELLED | OUTPATIENT
Start: 2018-02-03

## 2018-02-03 RX ORDER — SODIUM CHLORIDE, SODIUM LACTATE, POTASSIUM CHLORIDE, CALCIUM CHLORIDE 600; 310; 30; 20 MG/100ML; MG/100ML; MG/100ML; MG/100ML
125 INJECTION, SOLUTION INTRAVENOUS CONTINUOUS
Status: CANCELLED | OUTPATIENT
Start: 2018-02-03

## 2018-02-03 RX ORDER — ACETAMINOPHEN 325 MG/1
650 TABLET ORAL EVERY 4 HOURS PRN
Status: CANCELLED | OUTPATIENT
Start: 2018-02-03

## 2018-02-03 RX ORDER — IBUPROFEN 600 MG/1
600 TABLET ORAL EVERY 6 HOURS PRN
Status: CANCELLED | OUTPATIENT
Start: 2018-02-03

## 2018-02-03 RX ORDER — FAMOTIDINE 10 MG/ML
20 INJECTION, SOLUTION INTRAVENOUS ONCE
Status: COMPLETED | OUTPATIENT
Start: 2018-02-03 | End: 2018-02-03

## 2018-02-03 RX ORDER — ONDANSETRON 2 MG/ML
4 INJECTION INTRAMUSCULAR; INTRAVENOUS EVERY 8 HOURS PRN
Status: DISCONTINUED | OUTPATIENT
Start: 2018-02-03 | End: 2018-02-04 | Stop reason: HOSPADM

## 2018-02-03 RX ORDER — SODIUM CHLORIDE, SODIUM LACTATE, POTASSIUM CHLORIDE, CALCIUM CHLORIDE 600; 310; 30; 20 MG/100ML; MG/100ML; MG/100ML; MG/100ML
125 INJECTION, SOLUTION INTRAVENOUS CONTINUOUS
Status: DISCONTINUED | OUTPATIENT
Start: 2018-02-03 | End: 2018-02-05

## 2018-02-03 RX ORDER — ZOLPIDEM TARTRATE 5 MG/1
5 TABLET ORAL NIGHTLY PRN
Status: CANCELLED | OUTPATIENT
Start: 2018-02-03 | End: 2018-02-13

## 2018-02-03 RX ADMIN — BETAMETHASONE SODIUM PHOSPHATE AND BETAMETHASONE ACETATE 12 MG: 3; 3 INJECTION, SUSPENSION INTRA-ARTICULAR; INTRALESIONAL; INTRAMUSCULAR at 22:07

## 2018-02-03 RX ADMIN — FAMOTIDINE 20 MG: 10 INJECTION, SOLUTION INTRAVENOUS at 04:28

## 2018-02-03 RX ADMIN — SODIUM CHLORIDE, POTASSIUM CHLORIDE, SODIUM LACTATE AND CALCIUM CHLORIDE 125 ML/HR: 600; 310; 30; 20 INJECTION, SOLUTION INTRAVENOUS at 03:59

## 2018-02-03 RX ADMIN — BETAMETHASONE SODIUM PHOSPHATE AND BETAMETHASONE ACETATE 12 MG: 3; 3 INJECTION, SUSPENSION INTRA-ARTICULAR; INTRALESIONAL; INTRAMUSCULAR at 10:21

## 2018-02-03 RX ADMIN — SODIUM CHLORIDE, POTASSIUM CHLORIDE, SODIUM LACTATE AND CALCIUM CHLORIDE 1000 ML: 600; 310; 30; 20 INJECTION, SOLUTION INTRAVENOUS at 03:29

## 2018-02-03 RX ADMIN — SODIUM CHLORIDE, POTASSIUM CHLORIDE, SODIUM LACTATE AND CALCIUM CHLORIDE 125 ML/HR: 600; 310; 30; 20 INJECTION, SOLUTION INTRAVENOUS at 19:53

## 2018-02-03 NOTE — PROGRESS NOTES
2/3/2018  HD:0  22 y.o. yo female  at 36w1d    Subjective   Meganne feels well ; increased fetal movement. Discussed plan for steroids and delivery tomorrow given low fluid , smaller baby.       Objective   Temp: Temp:  [98.1 °F (36.7 °C)-98.5 °F (36.9 °C)] 98.1 °F (36.7 °C) Temp src: Oral   BP: BP: (116-136)/(70-86) 132/86        Pulse: Heart Rate:  [80-88] 88  RR: Resp:  [16] 16    General:  nad   Abdomen: Gravid, nontender         Lab Results   Component Value Date    WBC 9.89 2018    HGB 11.5 2018    HCT 34.7 2018    MCV 85.9 2018     2018    HEPBSAG Negative 2017                     Assessment  1.   22 y.o. yo female  at 36w1d  2. Oligohydramnios  3. Smaller fetus  4. Prior section  5. Desires sterilization - salpingectomy planned     Plan  1. Cont current in-patient management. I have discussed options with Jamila Bazan and [place] and have decided to give her steroids for lung maturity and delivery tomorrow given the above.    This note has been electronically signed.    Alvaro Simmons MD  February 3, 2018

## 2018-02-03 NOTE — H&P
"Lyssa [facility]  1995  [id number]  [id number]    CC: decreased fetal movement  HPI:  Patient is 22 y.o. white female   currently at 36w1d  Presents with c/o decreased fetal movement.  Some movement, just less frequently and less vigorously.  Occas uterine contractions, intermittent, rates 5/10, denies assoc vag bleeding or SROM. Seen if office today with JUDSON 4.5.  PNC comp by prev .     PMH:   Current meds PNV  Illnesses seizure d/o (last seizure age 11)  Surgeries , oral surg, leg surg (age 1)  Allergies NKDA    Past OB History:       Obstetric History       T0      L1     SAB0   TAB0   Ectopic0   Multiple0   Live Births1       # Outcome Date GA Lbr Davie/2nd Weight Sex Delivery Anes PTL Lv   2 Current            1  11 36w0d  2580 g (5 lb 11 oz) M CS-LTranv  Y ISSAC      Name: Charles      Complications: Gastroschisis      Obstetric Comments    - Charles (gastroschisis)            SH: tob neg , EtOH neg, drugs neg  FH: heart dz neg , diabetes pos , cancer pos    General ROS: All systems reviewed and negative except for N,D      Physical Examination: General appearance - alert, well appearing, and in no distress  Vital signs - /78 (BP Location: Right arm, Patient Position: Sitting)  Pulse 85  Temp 98.5 °F (36.9 °C) (Oral)   Resp 16  Ht 160 cm (63\")  Wt 99.3 kg (219 lb)  LMP 2017 (Approximate)  BMI 38.79 kg/m2  HEENT: normocephalic, atraumatic, pharynx clear   Neck - supple, no significant adenopathy  Lymphatics - no palpable lymphadenopathy, no hepatosplenomegaly  Chest - clear to auscultation, no wheezes, rales or rhonchi, symmetric air entry  Heart - normal rate, regular rhythm, normal S1, S2, no murmurs, rubs, clicks or gallops, no JVD  Abdomen - soft, nontender, nondistended, no masses or organomegaly  no rebound tenderness noted, bowel sounds normal  Vaginal Exam: deferred  Extremities - no pedal edema noted, no calf tend  Skin - " normal coloration and turgor, no rashes, no suspicious skin lesions noted        Fetal monitoring: indication decreased fetal movement , onset 0220 , offset 0312 , baseline 140 , mod BTB variability , multiple accels (15 X 15), no decels, occas contractions, interpretation reactive NST    Radiology US:JUDSON 4.0, vertex    Assessment 1)IUP 36 1/7 weeks    2)prev      3)oligohydramnios    Plan 1)admit     2)NPO      3)consider delivery in am for oligo   4)discussed with Dr. Iris Bazan MD  2/3/2018  3:13 AM

## 2018-02-04 ENCOUNTER — ANESTHESIA (OUTPATIENT)
Dept: LABOR AND DELIVERY | Facility: HOSPITAL | Age: 23
End: 2018-02-04

## 2018-02-04 ENCOUNTER — ANESTHESIA EVENT (OUTPATIENT)
Dept: LABOR AND DELIVERY | Facility: HOSPITAL | Age: 23
End: 2018-02-04

## 2018-02-04 PROCEDURE — 88302 TISSUE EXAM BY PATHOLOGIST: CPT | Performed by: OBSTETRICS & GYNECOLOGY

## 2018-02-04 PROCEDURE — 25010000003 CEFAZOLIN IN DEXTROSE 2-4 GM/100ML-% SOLUTION: Performed by: OBSTETRICS & GYNECOLOGY

## 2018-02-04 PROCEDURE — 25010000002 MIDAZOLAM PER 1 MG: Performed by: ANESTHESIOLOGY

## 2018-02-04 PROCEDURE — 58605 DIVISION OF FALLOPIAN TUBE: CPT | Performed by: OBSTETRICS & GYNECOLOGY

## 2018-02-04 PROCEDURE — 59514 CESAREAN DELIVERY ONLY: CPT | Performed by: OBSTETRICS & GYNECOLOGY

## 2018-02-04 PROCEDURE — 25010000002 ONDANSETRON PER 1 MG: Performed by: OBSTETRICS & GYNECOLOGY

## 2018-02-04 PROCEDURE — 25010000003 MORPHINE PER 10 MG: Performed by: ANESTHESIOLOGY

## 2018-02-04 PROCEDURE — 59025 FETAL NON-STRESS TEST: CPT

## 2018-02-04 PROCEDURE — 51702 INSERT TEMP BLADDER CATH: CPT

## 2018-02-04 PROCEDURE — 25010000002 FENTANYL CITRATE (PF) 100 MCG/2ML SOLUTION: Performed by: ANESTHESIOLOGY

## 2018-02-04 RX ORDER — BISACODYL 10 MG
10 SUPPOSITORY, RECTAL RECTAL DAILY PRN
Status: DISCONTINUED | OUTPATIENT
Start: 2018-02-04 | End: 2018-02-07 | Stop reason: HOSPADM

## 2018-02-04 RX ORDER — TRISODIUM CITRATE DIHYDRATE AND CITRIC ACID MONOHYDRATE 500; 334 MG/5ML; MG/5ML
30 SOLUTION ORAL ONCE
Status: COMPLETED | OUTPATIENT
Start: 2018-02-04 | End: 2018-02-04

## 2018-02-04 RX ORDER — FAMOTIDINE 20 MG/1
20 TABLET, FILM COATED ORAL 2 TIMES DAILY PRN
Status: DISCONTINUED | OUTPATIENT
Start: 2018-02-04 | End: 2018-02-05

## 2018-02-04 RX ORDER — KETOROLAC TROMETHAMINE 30 MG/ML
30 INJECTION, SOLUTION INTRAMUSCULAR; INTRAVENOUS EVERY 6 HOURS PRN
Status: DISCONTINUED | OUTPATIENT
Start: 2018-02-04 | End: 2018-02-06

## 2018-02-04 RX ORDER — HYDROCODONE BITARTRATE AND ACETAMINOPHEN 7.5; 325 MG/1; MG/1
1 TABLET ORAL EVERY 4 HOURS PRN
Status: DISCONTINUED | OUTPATIENT
Start: 2018-02-04 | End: 2018-02-05

## 2018-02-04 RX ORDER — PROMETHAZINE HYDROCHLORIDE 25 MG/1
25 TABLET ORAL EVERY 6 HOURS PRN
Status: DISCONTINUED | OUTPATIENT
Start: 2018-02-04 | End: 2018-02-05

## 2018-02-04 RX ORDER — LANOLIN 100 %
OINTMENT (GRAM) TOPICAL
Status: DISCONTINUED | OUTPATIENT
Start: 2018-02-04 | End: 2018-02-07 | Stop reason: HOSPADM

## 2018-02-04 RX ORDER — PROMETHAZINE HYDROCHLORIDE 25 MG/ML
12.5 INJECTION, SOLUTION INTRAMUSCULAR; INTRAVENOUS EVERY 6 HOURS PRN
Status: DISCONTINUED | OUTPATIENT
Start: 2018-02-04 | End: 2018-02-04 | Stop reason: SDUPTHER

## 2018-02-04 RX ORDER — METHYLERGONOVINE MALEATE 0.2 MG/ML
200 INJECTION INTRAVENOUS ONCE
Status: DISCONTINUED | OUTPATIENT
Start: 2018-02-04 | End: 2018-02-05

## 2018-02-04 RX ORDER — ACETAMINOPHEN 325 MG/1
650 TABLET ORAL EVERY 4 HOURS PRN
Status: DISCONTINUED | OUTPATIENT
Start: 2018-02-04 | End: 2018-02-07 | Stop reason: HOSPADM

## 2018-02-04 RX ORDER — ONDANSETRON 2 MG/ML
4 INJECTION INTRAMUSCULAR; INTRAVENOUS EVERY 6 HOURS PRN
Status: DISCONTINUED | OUTPATIENT
Start: 2018-02-04 | End: 2018-02-04 | Stop reason: SDUPTHER

## 2018-02-04 RX ORDER — PROMETHAZINE HYDROCHLORIDE 12.5 MG/1
12.5 SUPPOSITORY RECTAL EVERY 6 HOURS PRN
Status: DISCONTINUED | OUTPATIENT
Start: 2018-02-04 | End: 2018-02-04 | Stop reason: SDUPTHER

## 2018-02-04 RX ORDER — PROMETHAZINE HYDROCHLORIDE 12.5 MG/1
12.5 TABLET ORAL EVERY 6 HOURS PRN
Status: DISCONTINUED | OUTPATIENT
Start: 2018-02-04 | End: 2018-02-04 | Stop reason: SDUPTHER

## 2018-02-04 RX ORDER — PRENATAL VIT/IRON FUM/FOLIC AC 27MG-0.8MG
1 TABLET ORAL DAILY
Status: DISCONTINUED | OUTPATIENT
Start: 2018-02-04 | End: 2018-02-04 | Stop reason: SDUPTHER

## 2018-02-04 RX ORDER — ONDANSETRON 2 MG/ML
4 INJECTION INTRAMUSCULAR; INTRAVENOUS ONCE
Status: DISCONTINUED | OUTPATIENT
Start: 2018-02-04 | End: 2018-02-04 | Stop reason: HOSPADM

## 2018-02-04 RX ORDER — OXYTOCIN/RINGER'S LACTATE 20/1000 ML
999 PLASTIC BAG, INJECTION (ML) INTRAVENOUS ONCE
Status: DISCONTINUED | OUTPATIENT
Start: 2018-02-04 | End: 2018-02-04 | Stop reason: SDUPTHER

## 2018-02-04 RX ORDER — PROMETHAZINE HYDROCHLORIDE 25 MG/ML
12.5 INJECTION, SOLUTION INTRAMUSCULAR; INTRAVENOUS EVERY 6 HOURS PRN
Status: DISCONTINUED | OUTPATIENT
Start: 2018-02-04 | End: 2018-02-04 | Stop reason: HOSPADM

## 2018-02-04 RX ORDER — ONDANSETRON 4 MG/1
4 TABLET, FILM COATED ORAL EVERY 8 HOURS PRN
Status: DISCONTINUED | OUTPATIENT
Start: 2018-02-04 | End: 2018-02-04 | Stop reason: SDUPTHER

## 2018-02-04 RX ORDER — DIPHENHYDRAMINE HYDROCHLORIDE 50 MG/ML
25 INJECTION INTRAMUSCULAR; INTRAVENOUS NIGHTLY PRN
Status: DISCONTINUED | OUTPATIENT
Start: 2018-02-04 | End: 2018-02-04 | Stop reason: HOSPADM

## 2018-02-04 RX ORDER — MISOPROSTOL 200 UG/1
800 TABLET ORAL AS NEEDED
Status: DISCONTINUED | OUTPATIENT
Start: 2018-02-04 | End: 2018-02-04 | Stop reason: SDUPTHER

## 2018-02-04 RX ORDER — HYDROCODONE BITARTRATE AND ACETAMINOPHEN 5; 325 MG/1; MG/1
1 TABLET ORAL EVERY 4 HOURS PRN
Status: DISCONTINUED | OUTPATIENT
Start: 2018-02-04 | End: 2018-02-05

## 2018-02-04 RX ORDER — PROMETHAZINE HYDROCHLORIDE 12.5 MG/1
12.5 TABLET ORAL EVERY 6 HOURS PRN
Status: DISCONTINUED | OUTPATIENT
Start: 2018-02-04 | End: 2018-02-04 | Stop reason: HOSPADM

## 2018-02-04 RX ORDER — ACETAMINOPHEN 325 MG/1
650 TABLET ORAL ONCE AS NEEDED
Status: DISCONTINUED | OUTPATIENT
Start: 2018-02-04 | End: 2018-02-04 | Stop reason: HOSPADM

## 2018-02-04 RX ORDER — IBUPROFEN 600 MG/1
600 TABLET ORAL ONCE AS NEEDED
Status: DISCONTINUED | OUTPATIENT
Start: 2018-02-04 | End: 2018-02-04 | Stop reason: HOSPADM

## 2018-02-04 RX ORDER — NALOXONE HCL 0.4 MG/ML
0.4 VIAL (ML) INJECTION
Status: DISCONTINUED | OUTPATIENT
Start: 2018-02-04 | End: 2018-02-05

## 2018-02-04 RX ORDER — HYDROMORPHONE HYDROCHLORIDE 1 MG/ML
0.5 INJECTION, SOLUTION INTRAMUSCULAR; INTRAVENOUS; SUBCUTANEOUS
Status: DISCONTINUED | OUTPATIENT
Start: 2018-02-04 | End: 2018-02-04 | Stop reason: HOSPADM

## 2018-02-04 RX ORDER — CARBOPROST TROMETHAMINE 250 UG/ML
250 INJECTION, SOLUTION INTRAMUSCULAR ONCE
Status: DISCONTINUED | OUTPATIENT
Start: 2018-02-04 | End: 2018-02-05

## 2018-02-04 RX ORDER — ZOLPIDEM TARTRATE 5 MG/1
5 TABLET ORAL NIGHTLY PRN
Status: DISCONTINUED | OUTPATIENT
Start: 2018-02-04 | End: 2018-02-04 | Stop reason: HOSPADM

## 2018-02-04 RX ORDER — CEFAZOLIN SODIUM 2 G/100ML
2 INJECTION, SOLUTION INTRAVENOUS ONCE
Status: COMPLETED | OUTPATIENT
Start: 2018-02-04 | End: 2018-02-04

## 2018-02-04 RX ORDER — PRENATAL VIT/IRON FUM/FOLIC AC 27MG-0.8MG
1 TABLET ORAL DAILY
Status: DISCONTINUED | OUTPATIENT
Start: 2018-02-04 | End: 2018-02-07 | Stop reason: HOSPADM

## 2018-02-04 RX ORDER — CARBOPROST TROMETHAMINE 250 UG/ML
250 INJECTION, SOLUTION INTRAMUSCULAR AS NEEDED
Status: DISCONTINUED | OUTPATIENT
Start: 2018-02-04 | End: 2018-02-04 | Stop reason: SDUPTHER

## 2018-02-04 RX ORDER — HYDROXYZINE HYDROCHLORIDE 25 MG/1
50 TABLET, FILM COATED ORAL EVERY 6 HOURS PRN
Status: DISCONTINUED | OUTPATIENT
Start: 2018-02-04 | End: 2018-02-07 | Stop reason: HOSPADM

## 2018-02-04 RX ORDER — ONDANSETRON 4 MG/1
4 TABLET, FILM COATED ORAL EVERY 6 HOURS PRN
Status: DISCONTINUED | OUTPATIENT
Start: 2018-02-04 | End: 2018-02-04 | Stop reason: SDUPTHER

## 2018-02-04 RX ORDER — ZOLPIDEM TARTRATE 5 MG/1
5 TABLET ORAL NIGHTLY PRN
Status: DISCONTINUED | OUTPATIENT
Start: 2018-02-04 | End: 2018-02-07 | Stop reason: HOSPADM

## 2018-02-04 RX ORDER — LIDOCAINE HYDROCHLORIDE 10 MG/ML
5 INJECTION, SOLUTION INFILTRATION; PERINEURAL AS NEEDED
Status: DISCONTINUED | OUTPATIENT
Start: 2018-02-04 | End: 2018-02-04 | Stop reason: HOSPADM

## 2018-02-04 RX ORDER — MORPHINE SULFATE 0.5 MG/ML
INJECTION, SOLUTION EPIDURAL; INTRATHECAL; INTRAVENOUS AS NEEDED
Status: DISCONTINUED | OUTPATIENT
Start: 2018-02-04 | End: 2018-02-04 | Stop reason: SURG

## 2018-02-04 RX ORDER — IBUPROFEN 600 MG/1
600 TABLET ORAL EVERY 6 HOURS PRN
Status: DISCONTINUED | OUTPATIENT
Start: 2018-02-04 | End: 2018-02-07 | Stop reason: HOSPADM

## 2018-02-04 RX ORDER — CARBOPROST TROMETHAMINE 250 UG/ML
250 INJECTION, SOLUTION INTRAMUSCULAR AS NEEDED
Status: DISCONTINUED | OUTPATIENT
Start: 2018-02-04 | End: 2018-02-04 | Stop reason: HOSPADM

## 2018-02-04 RX ORDER — ACETAMINOPHEN 325 MG/1
650 TABLET ORAL EVERY 4 HOURS PRN
Status: DISCONTINUED | OUTPATIENT
Start: 2018-02-04 | End: 2018-02-04 | Stop reason: HOSPADM

## 2018-02-04 RX ORDER — SIMETHICONE 80 MG
80 TABLET,CHEWABLE ORAL 4 TIMES DAILY PRN
Status: DISCONTINUED | OUTPATIENT
Start: 2018-02-04 | End: 2018-02-07 | Stop reason: HOSPADM

## 2018-02-04 RX ORDER — NALOXONE HCL 0.4 MG/ML
0.1 VIAL (ML) INJECTION
Status: DISCONTINUED | OUTPATIENT
Start: 2018-02-04 | End: 2018-02-05

## 2018-02-04 RX ORDER — PROMETHAZINE HYDROCHLORIDE 12.5 MG/1
12.5 SUPPOSITORY RECTAL EVERY 6 HOURS PRN
Status: DISCONTINUED | OUTPATIENT
Start: 2018-02-04 | End: 2018-02-05

## 2018-02-04 RX ORDER — PROMETHAZINE HYDROCHLORIDE 25 MG/ML
12.5 INJECTION, SOLUTION INTRAMUSCULAR; INTRAVENOUS EVERY 6 HOURS PRN
Status: DISCONTINUED | OUTPATIENT
Start: 2018-02-04 | End: 2018-02-06

## 2018-02-04 RX ORDER — OXYTOCIN 10 [USP'U]/ML
INJECTION, SOLUTION INTRAMUSCULAR; INTRAVENOUS AS NEEDED
Status: DISCONTINUED | OUTPATIENT
Start: 2018-02-04 | End: 2018-02-04 | Stop reason: SURG

## 2018-02-04 RX ORDER — DOCUSATE SODIUM 100 MG/1
100 CAPSULE, LIQUID FILLED ORAL 2 TIMES DAILY PRN
Status: DISCONTINUED | OUTPATIENT
Start: 2018-02-04 | End: 2018-02-07 | Stop reason: HOSPADM

## 2018-02-04 RX ORDER — SIMETHICONE 80 MG
80 TABLET,CHEWABLE ORAL 4 TIMES DAILY
Status: DISCONTINUED | OUTPATIENT
Start: 2018-02-04 | End: 2018-02-07 | Stop reason: HOSPADM

## 2018-02-04 RX ORDER — FENTANYL CITRATE 50 UG/ML
INJECTION, SOLUTION INTRAMUSCULAR; INTRAVENOUS AS NEEDED
Status: DISCONTINUED | OUTPATIENT
Start: 2018-02-04 | End: 2018-02-04 | Stop reason: SURG

## 2018-02-04 RX ORDER — OXYTOCIN/RINGER'S LACTATE 20/1000 ML
125 PLASTIC BAG, INJECTION (ML) INTRAVENOUS CONTINUOUS PRN
Status: DISCONTINUED | OUTPATIENT
Start: 2018-02-04 | End: 2018-02-04 | Stop reason: SDUPTHER

## 2018-02-04 RX ORDER — MIDAZOLAM HYDROCHLORIDE 1 MG/ML
INJECTION INTRAMUSCULAR; INTRAVENOUS AS NEEDED
Status: DISCONTINUED | OUTPATIENT
Start: 2018-02-04 | End: 2018-02-04 | Stop reason: SURG

## 2018-02-04 RX ORDER — ONDANSETRON 4 MG/1
4 TABLET, FILM COATED ORAL EVERY 6 HOURS PRN
Status: DISCONTINUED | OUTPATIENT
Start: 2018-02-04 | End: 2018-02-07 | Stop reason: HOSPADM

## 2018-02-04 RX ORDER — IBUPROFEN 600 MG/1
600 TABLET ORAL EVERY 6 HOURS PRN
Status: DISCONTINUED | OUTPATIENT
Start: 2018-02-04 | End: 2018-02-04 | Stop reason: SDUPTHER

## 2018-02-04 RX ORDER — ACETAMINOPHEN 650 MG/1
650 SUPPOSITORY RECTAL EVERY 4 HOURS PRN
Status: DISCONTINUED | OUTPATIENT
Start: 2018-02-04 | End: 2018-02-04 | Stop reason: HOSPADM

## 2018-02-04 RX ORDER — METHYLERGONOVINE MALEATE 0.2 MG/ML
200 INJECTION INTRAVENOUS ONCE AS NEEDED
Status: DISCONTINUED | OUTPATIENT
Start: 2018-02-04 | End: 2018-02-04 | Stop reason: SDUPTHER

## 2018-02-04 RX ORDER — MISOPROSTOL 200 UG/1
600 TABLET ORAL ONCE
Status: DISCONTINUED | OUTPATIENT
Start: 2018-02-04 | End: 2018-02-05

## 2018-02-04 RX ORDER — HYDROMORPHONE HYDROCHLORIDE 1 MG/ML
0.5 INJECTION, SOLUTION INTRAMUSCULAR; INTRAVENOUS; SUBCUTANEOUS
Status: DISCONTINUED | OUTPATIENT
Start: 2018-02-04 | End: 2018-02-05

## 2018-02-04 RX ORDER — METHYLERGONOVINE MALEATE 0.2 MG/ML
200 INJECTION INTRAVENOUS AS NEEDED
Status: DISCONTINUED | OUTPATIENT
Start: 2018-02-04 | End: 2018-02-04 | Stop reason: HOSPADM

## 2018-02-04 RX ORDER — SODIUM CHLORIDE 0.9 % (FLUSH) 0.9 %
1-10 SYRINGE (ML) INJECTION AS NEEDED
Status: DISCONTINUED | OUTPATIENT
Start: 2018-02-04 | End: 2018-02-05

## 2018-02-04 RX ORDER — SODIUM CHLORIDE, SODIUM LACTATE, POTASSIUM CHLORIDE, CALCIUM CHLORIDE 600; 310; 30; 20 MG/100ML; MG/100ML; MG/100ML; MG/100ML
125 INJECTION, SOLUTION INTRAVENOUS CONTINUOUS
Status: DISCONTINUED | OUTPATIENT
Start: 2018-02-04 | End: 2018-02-04 | Stop reason: SDUPTHER

## 2018-02-04 RX ORDER — DIPHENHYDRAMINE HCL 25 MG
25 CAPSULE ORAL NIGHTLY PRN
Status: DISCONTINUED | OUTPATIENT
Start: 2018-02-04 | End: 2018-02-04 | Stop reason: HOSPADM

## 2018-02-04 RX ORDER — MISOPROSTOL 200 UG/1
800 TABLET ORAL AS NEEDED
Status: DISCONTINUED | OUTPATIENT
Start: 2018-02-04 | End: 2018-02-04 | Stop reason: HOSPADM

## 2018-02-04 RX ORDER — PROMETHAZINE HYDROCHLORIDE 12.5 MG/1
12.5 SUPPOSITORY RECTAL EVERY 6 HOURS PRN
Status: DISCONTINUED | OUTPATIENT
Start: 2018-02-04 | End: 2018-02-04 | Stop reason: HOSPADM

## 2018-02-04 RX ORDER — METOCLOPRAMIDE HYDROCHLORIDE 5 MG/ML
10 INJECTION INTRAMUSCULAR; INTRAVENOUS ONCE AS NEEDED
Status: DISCONTINUED | OUTPATIENT
Start: 2018-02-04 | End: 2018-02-04 | Stop reason: HOSPADM

## 2018-02-04 RX ORDER — SODIUM CHLORIDE 0.9 % (FLUSH) 0.9 %
1-10 SYRINGE (ML) INJECTION AS NEEDED
Status: DISCONTINUED | OUTPATIENT
Start: 2018-02-04 | End: 2018-02-04 | Stop reason: HOSPADM

## 2018-02-04 RX ORDER — BUPIVACAINE HYDROCHLORIDE 7.5 MG/ML
INJECTION, SOLUTION EPIDURAL; RETROBULBAR AS NEEDED
Status: DISCONTINUED | OUTPATIENT
Start: 2018-02-04 | End: 2018-02-04 | Stop reason: SURG

## 2018-02-04 RX ORDER — ONDANSETRON 2 MG/ML
4 INJECTION INTRAMUSCULAR; INTRAVENOUS EVERY 6 HOURS PRN
Status: DISCONTINUED | OUTPATIENT
Start: 2018-02-04 | End: 2018-02-05

## 2018-02-04 RX ORDER — HYDROCODONE BITARTRATE AND ACETAMINOPHEN 5; 325 MG/1; MG/1
1 TABLET ORAL EVERY 4 HOURS PRN
Status: DISCONTINUED | OUTPATIENT
Start: 2018-02-04 | End: 2018-02-04 | Stop reason: HOSPADM

## 2018-02-04 RX ADMIN — FAMOTIDINE 20 MG: 20 TABLET, FILM COATED ORAL at 03:36

## 2018-02-04 RX ADMIN — MORPHINE SULFATE 0.1 MG: 0.5 INJECTION, SOLUTION EPIDURAL; INTRATHECAL; INTRAVENOUS at 13:35

## 2018-02-04 RX ADMIN — OXYTOCIN 30 UNITS: 10 INJECTION, SOLUTION INTRAMUSCULAR; INTRAVENOUS at 14:00

## 2018-02-04 RX ADMIN — SODIUM CITRATE AND CITRIC ACID MONOHYDRATE 30 ML: 500; 334 SOLUTION ORAL at 13:27

## 2018-02-04 RX ADMIN — SODIUM CHLORIDE, POTASSIUM CHLORIDE, SODIUM LACTATE AND CALCIUM CHLORIDE 1000 ML: 600; 310; 30; 20 INJECTION, SOLUTION INTRAVENOUS at 13:25

## 2018-02-04 RX ADMIN — SIMETHICONE CHEW TAB 80 MG 80 MG: 80 TABLET ORAL at 21:46

## 2018-02-04 RX ADMIN — CEFAZOLIN SODIUM 2 G: 2 INJECTION, SOLUTION INTRAVENOUS at 13:27

## 2018-02-04 RX ADMIN — MIDAZOLAM HYDROCHLORIDE 1 MG: 1 INJECTION, SOLUTION INTRAMUSCULAR; INTRAVENOUS at 14:10

## 2018-02-04 RX ADMIN — BUPIVACAINE HYDROCHLORIDE 1.3 ML: 7.5 INJECTION, SOLUTION EPIDURAL; RETROBULBAR at 13:35

## 2018-02-04 RX ADMIN — FENTANYL CITRATE 15 MCG: 50 INJECTION, SOLUTION INTRAMUSCULAR; INTRAVENOUS at 13:35

## 2018-02-04 RX ADMIN — MIDAZOLAM HYDROCHLORIDE 1 MG: 1 INJECTION, SOLUTION INTRAMUSCULAR; INTRAVENOUS at 14:12

## 2018-02-04 RX ADMIN — SODIUM CHLORIDE, POTASSIUM CHLORIDE, SODIUM LACTATE AND CALCIUM CHLORIDE: 600; 310; 30; 20 INJECTION, SOLUTION INTRAVENOUS at 14:07

## 2018-02-04 RX ADMIN — IBUPROFEN 600 MG: 600 TABLET, FILM COATED ORAL at 15:21

## 2018-02-04 RX ADMIN — MIDAZOLAM HYDROCHLORIDE 1 MG: 1 INJECTION, SOLUTION INTRAMUSCULAR; INTRAVENOUS at 14:05

## 2018-02-04 RX ADMIN — ONDANSETRON 4 MG: 2 INJECTION INTRAMUSCULAR; INTRAVENOUS at 23:29

## 2018-02-04 RX ADMIN — Medication 3 APPLICATION: at 18:56

## 2018-02-04 RX ADMIN — SODIUM CHLORIDE, POTASSIUM CHLORIDE, SODIUM LACTATE AND CALCIUM CHLORIDE: 600; 310; 30; 20 INJECTION, SOLUTION INTRAVENOUS at 13:27

## 2018-02-04 RX ADMIN — HYDROCODONE BITARTRATE AND ACETAMINOPHEN 1 TABLET: 7.5; 325 TABLET ORAL at 19:17

## 2018-02-04 RX ADMIN — ONDANSETRON 4 MG: 2 INJECTION INTRAMUSCULAR; INTRAVENOUS at 18:10

## 2018-02-04 RX ADMIN — OXYTOCIN 30 UNITS: 10 INJECTION, SOLUTION INTRAMUSCULAR; INTRAVENOUS at 14:07

## 2018-02-04 RX ADMIN — SODIUM CHLORIDE, POTASSIUM CHLORIDE, SODIUM LACTATE AND CALCIUM CHLORIDE 125 ML/HR: 600; 310; 30; 20 INJECTION, SOLUTION INTRAVENOUS at 06:50

## 2018-02-04 RX ADMIN — MIDAZOLAM HYDROCHLORIDE 1 MG: 1 INJECTION, SOLUTION INTRAMUSCULAR; INTRAVENOUS at 13:28

## 2018-02-04 RX ADMIN — FAMOTIDINE 20 MG: 20 TABLET, FILM COATED ORAL at 19:28

## 2018-02-04 RX ADMIN — HYDROCODONE BITARTRATE AND ACETAMINOPHEN 1 TABLET: 5; 325 TABLET ORAL at 15:20

## 2018-02-04 RX ADMIN — MIDAZOLAM HYDROCHLORIDE 1 MG: 1 INJECTION, SOLUTION INTRAMUSCULAR; INTRAVENOUS at 14:13

## 2018-02-04 NOTE — PLAN OF CARE
Problem: Patient Care Overview (Adult)  Goal: Plan of Care Review  Outcome: Ongoing (interventions implemented as appropriate)   18 1049   Coping/Psychosocial Response Interventions   Plan Of Care Reviewed With patient   Patient Care Overview   Progress progress toward functional goals as expected     Goal: Adult Individualization and Mutuality  Outcome: Ongoing (interventions implemented as appropriate)   18 1049   Individualization   Patient Specific Preferences Repeat  section   Patient Specific Goals Pt wishes to breastfeed   Patient Specific Interventions Spinal     Goal: Discharge Needs Assessment  Outcome: Ongoing (interventions implemented as appropriate)   18 1049   Discharge Needs Assessment   Concerns To Be Addressed no discharge needs identified   Readmission Within The Last 30 Days no previous admission in last 30 days   Discharge Disposition still a patient   Living Environment   Transportation Available car;family or friend will provide       Problem:  Delivery (Adult,Obstetrics,Pediatric)  Goal: Signs and Symptoms of Listed Potential Problems Will be Absent or Manageable ( Delivery)  Outcome: Ongoing (interventions implemented as appropriate)   18 1049    Delivery   Problems Assessed ( Delivery) all   Problems Present ( Delivery) none

## 2018-02-04 NOTE — ANESTHESIA PROCEDURE NOTES
Spinal Block    Patient location during procedure: OB  Indication:procedure for pain  Performed By  Anesthesiologist: AUSTIN CANDELARIA  Preanesthetic Checklist  Completed: patient identified, surgical consent, pre-op evaluation, timeout performed, IV checked, risks and benefits discussed and monitors and equipment checked  Spinal Block Prep:  Patient Position:sitting  Sterile Tech:cap, gloves, mask and sterile barriers  Prep:DuraPrep  Patient Monitoring:blood pressure monitoring and EKG  Spinal Block Procedure  Approach:midline  Guidance:palpation technique  Location:L3-L4  Needle Type:Atul  Needle Gauge:25 G  Placement of Spinal needle event:cerebrospinal fluid aspirated  Paresthesia: no  Fluid Appearance:clear  Post Assessment  Patient Tolerance:patient tolerated the procedure well with no apparent complications  Complications no

## 2018-02-04 NOTE — ANESTHESIA PREPROCEDURE EVALUATION
Anesthesia Evaluation     Patient summary reviewed and Nursing notes reviewed   NPO Solid Status: > 8 hours  NPO Liquid Status: > 8 hours     Airway   Mallampati: I  TM distance: <3 FB  Neck ROM: full  no difficulty expected  Dental      Pulmonary - negative pulmonary ROS   Cardiovascular - negative cardio ROS        Neuro/Psych- negative ROS  GI/Hepatic/Renal/Endo    (+) obesity,      Musculoskeletal (-) negative ROS    Abdominal    Substance History - negative use     OB/GYN    (+) Pregnant,         Other - negative ROS                                               Anesthesia Plan    ASA 2 - emergent     spinal and ITN   (RC/S, low JUDSON, steroids complete)  Anesthetic plan and risks discussed with patient and spouse/significant other.

## 2018-02-04 NOTE — ANESTHESIA POSTPROCEDURE EVALUATION
Patient: Lyssa Javier    Procedure Summary     Date Anesthesia Start Anesthesia Stop Room / Location    18 1327  [facility]       Procedure Diagnosis Surgeon Provider     SECTION REPEAT WITH TUBAL (Bilateral Abdomen) No diagnosis on file. MD Marta Alvarenga,           Anesthesia Type: spinal, ITN  Last vitals  BP   112/58   Temp   97.8   Pulse   97   Resp   16   SpO2    95     Post Anesthesia Care and Evaluation    Patient location during evaluation: bedside  Patient participation: complete - patient participated  Level of consciousness: awake and awake and alert  Pain score: 0  Pain management: satisfactory to patient  Airway patency: patent  Anesthetic complications: No anesthetic complications    Cardiovascular status: acceptable  Respiratory status: acceptable  Hydration status: acceptable  Post Neuraxial Block status: No signs or symptoms of PDPH

## 2018-02-04 NOTE — OP NOTE
Operative Note    Patient name: Lyssa Javier  YOB: 1995   MRN: [id number]  Admission Date: 2/3/2018  Referring Provider: Roe Pollard MD    ID: 22 y.o.  at 36w2d    Preoperative Diagnosis:   Patient Active Problem List   Diagnosis   • Well woman exam   • High-risk pregnancy in third trimester   • Previous LTCS (2 layer closure) - repeat C/S and BTL -  @ 7:30   • GBS bacteruria in 1st trimester   • Request for sterilization - signed consents on charts   • Borderline GDMA   • Oligohydramnios in third trimester (normal MVP)   • Oligohydramnios antepartum, third trimester, not applicable or unspecified fetus       Postoperative Diagnosis: Same as above.    Procedure(s): repeatlow transverse  delivery     Procedures:    *  SECTION REPEAT WITH TUBAL    Procedure(s):   SECTION PRIMARY WITH SALPINGECTOMY    Surgeons: Surgeon(s) and Role:     * Alvaro Simmons MD - Primary     * Levy Beebe DO - Assisting    Anesthesia: Spinal    Estimated Blood Loss: 1000 mL mL    IV Fluids: [unfilled]    Preoperative antibiotic: cefazolin (Ancef)    Pathology:   Order Name Source Comment Collection Info Order Time   TISSUE PATHOLOGY EXAM Fallopian Tubes, Bilateral  Collected By: Patrica Dhaliwal RN 2018  2:07 PM       Drains: Bartlett catheter to gravity    Complications: None    Condition: Stable to recovery room    Infant:                 Gender: female  infant    Weight: 2614 g (5 lb 12.2 oz)     Apgars: 8   @ 1 minute /     9   @ 5 minutes    Cord gases: Venous:  @BABYNOHDR(BRIEFLAB, PHCVEN, BECVEN)@     Arterial:  @BABYNOHDR(BRIEFLAB, PHCART, BECART)@         Operative Summary:   After obtaining informed consent the patient was taken to the operating room where adequate anesthesia was obtained.  Bartlett catheter was placed in the bladder preoperatively.  IV antibiotics were given preoperatively.       The abdomen was prepped and draped in the usual sterile fashion for   delivery. Dr. Beebe began the operation on the patients right side and I was on her left side, scrubbing in after he began initial dissections. After confirming adequate anesthesia a Pfannenstiel skin incision was made with the scalpel and carried through to the underlying layer of fascia.  The fascia was incised in the midline and the incision extended laterally with the Hartmann scissors and/or blunt dissection.       The upper aspect of the fascia was grasped, elevated, and dissected off the underlying rectus muscles bluntly and/or with the Hartmann scissors.  This was repeated on the inferior aspect of the fascia.  The fascia was dissected off of the rectus muscles in the same fashion.  The peritoneum was entered bluntly.  The incision was stretched and the bladder blade and Pfeiffer retractor inserted for visualization of the uterus.       The uterus was incised with the scalpel in a low transverse fashion.  The uterine incision was entered digitally and the incision extended bluntly in a cranial-caudal fashion.  Retractors were removed and membranes were ruptured.  The infant was delivered atraumatically from vertex presentation.  The umbilical cord was milked ×5 prior to being clamped and cut.  The nose and mouth were bulb suctioned.  The infant was handed off to waiting pediatric staff.       Cord blood gases were not collected.  Cord blood was collected.  The placenta was removed manually and with cord traction..  The uterus was exteriorized and cleared of all clots and debris.  The uterine incision was repaired with 0 chromic in a running locked fashion. A single-layer technique was used.  Additional hemostatic measures required: none.    The incision was inspected and hemostasis was noted.  The tubes and ovaries were noted to be normal. The right tube was grasped with a Colfax clamp.  An incision in the mesosalpinx was made with the bovie.  Two free ties of plain gut were passed through the  incision proximally.  The entirety of the tube was ligated and excised with cautery and DeBakey pickups. The left tube was identified ligated and excised in a similar fashion.  Excellent hemostasis was noted bilaterally. The uterus was returned to the abdomen.  The gutters were cleared of all clots and debris.  Irrigation was used. The uterine incision and tubal sites were inspected and found to be hemostatic.      The fascia was closed with PDS Stratafix in a running fashion.  The subcutaneous space was reapproximated using #1 Chromic gut.  The skin was closed using a stapler.      The patient was transferred to the recovery room in stable condition.    Alvaro Simmons MD  2/4/2018  2:40 PM

## 2018-02-04 NOTE — PROGRESS NOTES
2018  HD:1  22 y.o. yo female  at 36w2d    Subjective   Lyssa feels good and has no questions about her planned repeat  section and bilateral salpingectomy for sterilization.       Objective   Temp: Temp:  [97.5 °F (36.4 °C)-98.9 °F (37.2 °C)] 97.5 °F (36.4 °C) Temp src: Oral   BP: BP: (110-144)/(54-85) 123/74        Pulse: Heart Rate:  [] 100  RR: Resp:  [18] 18    General:  nad   Abdomen: Gravid, nontender         Lab Results   Component Value Date    WBC 9.89 2018    HGB 11.5 2018    HCT 34.7 2018    MCV 85.9 2018     2018    HEPBSAG Negative 2017                     Assessment  1.   22 y.o. yo female  at 36w2d  2.  Oligo/decreased FM/repeat section/sterilization desired    Plan  1. Cont current hospital care.   2.  Repeat  and salpingectomy at 1:00 pm today    This note has been electronically signed.    Alvaro Simmons MD  2018

## 2018-02-05 PROBLEM — Z30.2 REQUEST FOR STERILIZATION: Status: RESOLVED | Noted: 2017-11-20 | Resolved: 2018-02-05

## 2018-02-05 PROBLEM — O24.410 DIET CONTROLLED GESTATIONAL DIABETES MELLITUS (GDM) IN THIRD TRIMESTER: Status: RESOLVED | Noted: 2017-12-11 | Resolved: 2018-02-05

## 2018-02-05 PROBLEM — O34.219 PREVIOUS CESAREAN SECTION COMPLICATING PREGNANCY: Status: RESOLVED | Noted: 2017-09-28 | Resolved: 2018-02-05

## 2018-02-05 PROBLEM — O09.93 HIGH-RISK PREGNANCY IN THIRD TRIMESTER: Status: RESOLVED | Noted: 2017-09-28 | Resolved: 2018-02-05

## 2018-02-05 PROBLEM — O41.03X0 OLIGOHYDRAMNIOS ANTEPARTUM, THIRD TRIMESTER, NOT APPLICABLE OR UNSPECIFIED FETUS: Status: RESOLVED | Noted: 2018-02-03 | Resolved: 2018-02-05

## 2018-02-05 PROBLEM — O41.03X0 OLIGOHYDRAMNIOS IN THIRD TRIMESTER: Status: RESOLVED | Noted: 2018-01-31 | Resolved: 2018-02-05

## 2018-02-05 PROBLEM — R82.71 GROUP B STREPTOCOCCAL BACTERIURIA: Status: RESOLVED | Noted: 2017-11-09 | Resolved: 2018-02-05

## 2018-02-05 LAB
BASOPHILS # BLD AUTO: 0.01 10*3/MM3 (ref 0–0.2)
BASOPHILS NFR BLD AUTO: 0.1 % (ref 0–1)
DEPRECATED RDW RBC AUTO: 43.7 FL (ref 37–54)
EOSINOPHIL # BLD AUTO: 0.02 10*3/MM3 (ref 0–0.3)
EOSINOPHIL NFR BLD AUTO: 0.2 % (ref 0–3)
ERYTHROCYTE [DISTWIDTH] IN BLOOD BY AUTOMATED COUNT: 13.9 % (ref 11.3–14.5)
HCT VFR BLD AUTO: 30.7 % (ref 34.5–44)
HGB BLD-MCNC: 10 G/DL (ref 11.5–15.5)
IMM GRANULOCYTES # BLD: 0.09 10*3/MM3 (ref 0–0.03)
IMM GRANULOCYTES NFR BLD: 0.7 % (ref 0–0.6)
LYMPHOCYTES # BLD AUTO: 2.29 10*3/MM3 (ref 0.6–4.8)
LYMPHOCYTES NFR BLD AUTO: 18.6 % (ref 24–44)
MCH RBC QN AUTO: 28.6 PG (ref 27–31)
MCHC RBC AUTO-ENTMCNC: 32.6 G/DL (ref 32–36)
MCV RBC AUTO: 87.7 FL (ref 80–99)
MONOCYTES # BLD AUTO: 0.98 10*3/MM3 (ref 0–1)
MONOCYTES NFR BLD AUTO: 8 % (ref 0–12)
NEUTROPHILS # BLD AUTO: 8.91 10*3/MM3 (ref 1.5–8.3)
NEUTROPHILS NFR BLD AUTO: 72.4 % (ref 41–71)
PLATELET # BLD AUTO: 238 10*3/MM3 (ref 150–450)
PMV BLD AUTO: 9.5 FL (ref 6–12)
RBC # BLD AUTO: 3.5 10*6/MM3 (ref 3.89–5.14)
WBC NRBC COR # BLD: 12.3 10*3/MM3 (ref 3.5–10.8)

## 2018-02-05 PROCEDURE — 25010000002 INFLUENZA VAC SPLIT QUAD 0.5 ML SUSPENSION PREFILLED SYRINGE: Performed by: OBSTETRICS & GYNECOLOGY

## 2018-02-05 PROCEDURE — G0008 ADMIN INFLUENZA VIRUS VAC: HCPCS | Performed by: OBSTETRICS & GYNECOLOGY

## 2018-02-05 PROCEDURE — 99232 SBSQ HOSP IP/OBS MODERATE 35: CPT | Performed by: OBSTETRICS & GYNECOLOGY

## 2018-02-05 PROCEDURE — 85025 COMPLETE CBC W/AUTO DIFF WBC: CPT | Performed by: OBSTETRICS & GYNECOLOGY

## 2018-02-05 PROCEDURE — 90686 IIV4 VACC NO PRSV 0.5 ML IM: CPT | Performed by: OBSTETRICS & GYNECOLOGY

## 2018-02-05 RX ORDER — HYDROCODONE BITARTRATE AND ACETAMINOPHEN 5; 325 MG/1; MG/1
2 TABLET ORAL EVERY 4 HOURS PRN
Status: DISCONTINUED | OUTPATIENT
Start: 2018-02-05 | End: 2018-02-07 | Stop reason: HOSPADM

## 2018-02-05 RX ORDER — HYDROCODONE BITARTRATE AND ACETAMINOPHEN 5; 325 MG/1; MG/1
1 TABLET ORAL EVERY 4 HOURS PRN
Status: DISCONTINUED | OUTPATIENT
Start: 2018-02-05 | End: 2018-02-07 | Stop reason: HOSPADM

## 2018-02-05 RX ADMIN — SIMETHICONE CHEW TAB 80 MG 80 MG: 80 TABLET ORAL at 07:34

## 2018-02-05 RX ADMIN — PRENATAL VIT W/ FE FUMARATE-FA TAB 27-0.8 MG 1 TABLET: 27-0.8 TAB at 07:34

## 2018-02-05 RX ADMIN — IBUPROFEN 600 MG: 600 TABLET, FILM COATED ORAL at 16:00

## 2018-02-05 RX ADMIN — INFLUENZA VIRUS VACCINE 0.5 ML: 15; 15; 15; 15 SUSPENSION INTRAMUSCULAR at 11:08

## 2018-02-05 RX ADMIN — IBUPROFEN 600 MG: 600 TABLET, FILM COATED ORAL at 07:34

## 2018-02-05 RX ADMIN — HYDROCODONE BITARTRATE AND ACETAMINOPHEN 1 TABLET: 5; 325 TABLET ORAL at 19:29

## 2018-02-05 RX ADMIN — HYDROCODONE BITARTRATE AND ACETAMINOPHEN 1 TABLET: 7.5; 325 TABLET ORAL at 06:41

## 2018-02-05 RX ADMIN — IBUPROFEN 600 MG: 600 TABLET, FILM COATED ORAL at 10:14

## 2018-02-05 RX ADMIN — HYDROCODONE BITARTRATE AND ACETAMINOPHEN 1 TABLET: 5; 325 TABLET ORAL at 01:19

## 2018-02-05 RX ADMIN — SODIUM CHLORIDE, POTASSIUM CHLORIDE, SODIUM LACTATE AND CALCIUM CHLORIDE 125 ML/HR: 600; 310; 30; 20 INJECTION, SOLUTION INTRAVENOUS at 00:00

## 2018-02-05 RX ADMIN — HYDROCODONE BITARTRATE AND ACETAMINOPHEN 1 TABLET: 5; 325 TABLET ORAL at 15:05

## 2018-02-05 RX ADMIN — HYDROCODONE BITARTRATE AND ACETAMINOPHEN 1 TABLET: 5; 325 TABLET ORAL at 10:48

## 2018-02-05 RX ADMIN — IBUPROFEN 600 MG: 600 TABLET, FILM COATED ORAL at 01:19

## 2018-02-05 RX ADMIN — SIMETHICONE CHEW TAB 80 MG 80 MG: 80 TABLET ORAL at 15:05

## 2018-02-05 NOTE — ANESTHESIA POSTPROCEDURE EVALUATION
Patient: Lyssa Javier    Procedure Summary     Date Anesthesia Start Anesthesia Stop Room / Location    18 1327 1434 [facility] /  [facility]       Procedure Diagnosis Surgeon Provider     SECTION REPEAT WITH TUBAL (Bilateral Abdomen) No diagnosis on file. MD Marta Alvarenga,           Anesthesia Type: spinal, ITN  Last vitals  BP   99/55 (18 0400)   Temp   98.3 °F (36.8 °C) (18 0400)   Pulse   71 (18 0400)   Resp   16 (18 0400)     SpO2   96 % (18 1530)     Post Anesthesia Care and Evaluation    Patient location during evaluation: bedside  Patient participation: complete - patient participated  Level of consciousness: awake and alert  Pain management: adequate  Airway patency: patent  Anesthetic complications: No anesthetic complications    Cardiovascular status: acceptable  Respiratory status: acceptable  Hydration status: acceptable  Post Neuraxial Block status: Motor and sensory function returned to baseline and No signs or symptoms of PDPH

## 2018-02-05 NOTE — LACTATION NOTE
This note was copied from a baby's chart.  Mom states baby is struggling to latch on right breast but is nursing well on left.  Mom reports baby is sleepy at the breast.  Initiated on hospital grade pump.  Mom to call for personal pump today.  Encouraged mom to pump post feedings and educated on latching techniques and syringe/finger feeding.       18 0826   Maternal Information   Date of Referral 18   Person Making Referral other (see comments)  (courtesy)   Infant Reason for Referral 35-37 weeks gestation   Maternal Infant Feeding   Maternal Emotional State anxious   Previous Breastfeeding History yes   Current Delivery Breastfeeding History   Currently Breastfeeding no   Equipment Type/Education   Breast Pump Type double electric, hospital grade   Breast Pump Flange Type hard   Breast Pump Flange Size 24 mm    Breastfeeding   Breast Pumping Interventions post-feed pumping encouraged

## 2018-02-05 NOTE — PLAN OF CARE
Problem: Patient Care Overview (Adult)  Goal: Plan of Care Review  Outcome: Ongoing (interventions implemented as appropriate)   18   Coping/Psychosocial Response Interventions   Plan Of Care Reviewed With patient   Patient Care Overview   Progress improving   Outcome Evaluation   Outcome Summary/Follow up Plan VSS, fundus, lochia and incision WDL, n/v x 1 episode this shift, multiple breastfeeding attempts     Goal: Adult Individualization and Mutuality  Outcome: Ongoing (interventions implemented as appropriate)   18   Individualization   Patient Specific Preferences breastfeeding   Patient Specific Goals pain control   Patient Specific Interventions offer pain meds prn and assist with feedings     Goal: Discharge Needs Assessment  Outcome: Ongoing (interventions implemented as appropriate)   18   Discharge Needs Assessment   Concerns To Be Addressed no discharge needs identified       Problem: Postpartum ( Delivery) (Adult)  Goal: Signs and Symptoms of Listed Potential Problems Will be Absent or Manageable (Postpartum)  Outcome: Ongoing (interventions implemented as appropriate)   18   Postpartum ( Delivery)   Problems Assessed (Postpartum ) all   Problems Present (Postpartum ) none

## 2018-02-05 NOTE — PROGRESS NOTES
CHARLEY Javier  : 1995  MRN: [id number]  CSN: [id number]    Post-operative Day #1  Subjective   Her pain is well controlled. Vaginal bleeding is normal in amount.     Objective     Min/max vitals past 24 hours:   Temp  Min: 97.4 °F (36.3 °C)  Max: 99.2 °F (37.3 °C)  BP  Min: 99/55  Max: 133/93  Pulse  Min: 69  Max: 100  Resp  Min: 16  Max: 18        General: well developed; well nourished  no acute distress   Abdomen: soft, non-tender; no masses  no umbilical or inginual hernias are present  no hepato-splenomegaly  incision is covered by bandage  fundus firm and non-tender   Pelvic: Not performed   Ext: Calves NT     Last 3 values     Results from last 7 days  Lab Units 18  0328   WBC 10*3/mm3 9.89   HEMOGLOBIN g/dL 11.5   HEMATOCRIT % 34.7   PLATELETS 10*3/mm3 245     Lab Results   Component Value Date    RH Positive 2018    HEPBSAG Negative 2017          Assessment   1. POD #1 S/P Repeat  (LTCS)  - 1 layer closure with bilateral total salpingectomy  2. Doing well     Plan   1. Continue routine post-operative care  2. Ambulate  3. Advance diet    Roe Pollard MD  2018  7:22 AM

## 2018-02-06 LAB
CYTO UR: NORMAL
LAB AP CASE REPORT: NORMAL
LAB AP CLINICAL INFORMATION: NORMAL
Lab: NORMAL
PATH REPORT.FINAL DX SPEC: NORMAL
PATH REPORT.GROSS SPEC: NORMAL

## 2018-02-06 PROCEDURE — 99232 SBSQ HOSP IP/OBS MODERATE 35: CPT | Performed by: OBSTETRICS & GYNECOLOGY

## 2018-02-06 RX ADMIN — HYDROCODONE BITARTRATE AND ACETAMINOPHEN 1 TABLET: 5; 325 TABLET ORAL at 00:05

## 2018-02-06 RX ADMIN — SIMETHICONE CHEW TAB 80 MG 80 MG: 80 TABLET ORAL at 17:06

## 2018-02-06 RX ADMIN — HYDROCODONE BITARTRATE AND ACETAMINOPHEN 1 TABLET: 5; 325 TABLET ORAL at 21:11

## 2018-02-06 RX ADMIN — SIMETHICONE CHEW TAB 80 MG 80 MG: 80 TABLET ORAL at 08:00

## 2018-02-06 RX ADMIN — SIMETHICONE CHEW TAB 80 MG 80 MG: 80 TABLET ORAL at 13:24

## 2018-02-06 RX ADMIN — IBUPROFEN 600 MG: 600 TABLET, FILM COATED ORAL at 07:22

## 2018-02-06 RX ADMIN — SIMETHICONE CHEW TAB 80 MG 80 MG: 80 TABLET ORAL at 21:11

## 2018-02-06 RX ADMIN — Medication: at 19:52

## 2018-02-06 RX ADMIN — IBUPROFEN 600 MG: 600 TABLET, FILM COATED ORAL at 00:05

## 2018-02-06 RX ADMIN — IBUPROFEN 600 MG: 600 TABLET, FILM COATED ORAL at 13:24

## 2018-02-06 RX ADMIN — HYDROCODONE BITARTRATE AND ACETAMINOPHEN 1 TABLET: 5; 325 TABLET ORAL at 04:00

## 2018-02-06 RX ADMIN — DOCUSATE SODIUM 100 MG: 100 CAPSULE, LIQUID FILLED ORAL at 08:11

## 2018-02-06 RX ADMIN — HYDROCODONE BITARTRATE AND ACETAMINOPHEN 1 TABLET: 5; 325 TABLET ORAL at 17:05

## 2018-02-06 RX ADMIN — IBUPROFEN 600 MG: 600 TABLET, FILM COATED ORAL at 19:52

## 2018-02-06 RX ADMIN — HYDROCODONE BITARTRATE AND ACETAMINOPHEN 1 TABLET: 5; 325 TABLET ORAL at 13:24

## 2018-02-06 RX ADMIN — SIMETHICONE CHEW TAB 80 MG 80 MG: 80 TABLET ORAL at 08:11

## 2018-02-06 RX ADMIN — PRENATAL VIT W/ FE FUMARATE-FA TAB 27-0.8 MG 1 TABLET: 27-0.8 TAB at 08:11

## 2018-02-06 NOTE — PROGRESS NOTES
[facility]  [facility] 1995  MRN: [id number]  CSN: [id number]    Post-operative Day #2  Subjective   Her pain is well controlled. Vaginal bleeding is normal in amount. She is ambulating without difficulty. She is passing gas. She is voiding without difficulty. Her baby is doing well.     Objective     Min/max vitals past 24 hours:   Temp  Min: 97.4 °F (36.3 °C)  Max: 98.2 °F (36.8 °C)  BP  Min: 101/65  Max: 127/70  Pulse  Min: 71  Max: 88  Resp  Min: 16  Max: 18        General: well developed; well nourished  no acute distress   Abdomen: soft, non-tender; no masses  no umbilical or inginual hernias are present  no hepato-splenomegaly  incision is clean, dry, intact and without drainage  fundus firm and non-tender   Pelvic: Not performed   Ext: Calves NT       Results from last 7 days  Lab Units 18  0810 18  0328   WBC 10*3/mm3 12.30* 9.89   HEMOGLOBIN g/dL 10.0* 11.5   HEMATOCRIT % 30.7* 34.7   PLATELETS 10*3/mm3 238 245     Lab Results   Component Value Date    RH Positive 2018    HEPBSAG Negative 2017        Assessment   1. POD #2 S/P Repeat  (LTCS)  2. Postpartum anemia     Plan   1. Continue routine post-operative care    Roe Pollard MD  2018  8:28 AM

## 2018-02-07 VITALS
SYSTOLIC BLOOD PRESSURE: 130 MMHG | TEMPERATURE: 98.3 F | HEART RATE: 73 BPM | RESPIRATION RATE: 14 BRPM | WEIGHT: 219 LBS | DIASTOLIC BLOOD PRESSURE: 74 MMHG | HEIGHT: 63 IN | OXYGEN SATURATION: 96 % | BODY MASS INDEX: 38.8 KG/M2

## 2018-02-07 PROCEDURE — 99238 HOSP IP/OBS DSCHRG MGMT 30/<: CPT | Performed by: OBSTETRICS & GYNECOLOGY

## 2018-02-07 RX ORDER — IBUPROFEN 600 MG/1
600 TABLET ORAL EVERY 6 HOURS PRN
Start: 2018-02-07

## 2018-02-07 RX ORDER — FERROUS SULFATE 325(65) MG
325 TABLET ORAL
Qty: 30 TABLET | Refills: 3 | Status: SHIPPED | OUTPATIENT
Start: 2018-02-07 | End: 2018-02-21 | Stop reason: HOSPADM

## 2018-02-07 RX ORDER — HYDROCODONE BITARTRATE AND ACETAMINOPHEN 5; 325 MG/1; MG/1
1-2 TABLET ORAL EVERY 4 HOURS PRN
Qty: 22 TABLET | Refills: 0 | Status: SHIPPED | OUTPATIENT
Start: 2018-02-07 | End: 2018-02-21 | Stop reason: HOSPADM

## 2018-02-07 RX ADMIN — IBUPROFEN 600 MG: 600 TABLET, FILM COATED ORAL at 07:33

## 2018-02-07 RX ADMIN — SIMETHICONE CHEW TAB 80 MG 80 MG: 80 TABLET ORAL at 07:33

## 2018-02-07 RX ADMIN — PRENATAL VIT W/ FE FUMARATE-FA TAB 27-0.8 MG 1 TABLET: 27-0.8 TAB at 07:34

## 2018-02-07 RX ADMIN — DOCUSATE SODIUM 100 MG: 100 CAPSULE, LIQUID FILLED ORAL at 07:34

## 2018-02-07 RX ADMIN — HYDROCODONE BITARTRATE AND ACETAMINOPHEN 1 TABLET: 5; 325 TABLET ORAL at 01:23

## 2018-02-07 RX ADMIN — HYDROCODONE BITARTRATE AND ACETAMINOPHEN 1 TABLET: 5; 325 TABLET ORAL at 05:31

## 2018-02-07 RX ADMIN — IBUPROFEN 600 MG: 600 TABLET, FILM COATED ORAL at 01:23

## 2018-02-07 NOTE — DISCHARGE SUMMARY
Discharge Summary     Christiano Jaiver  : 1995  MRN: [id number]  CSN: [id number]    Date of Admission: 2/3/2018   Date of Discharge:  2018   Delivering Physician: Alvaro Simmons MD       Admission Diagnosis: 1. Pregnancy at 36w2d  2. Oligohydramnios  3. Previous C/S - declines   4. Desires sterilization     Discharge Diagnosis: 1. Same as above plus  2. Pregnancy at 36w2d - delivered  3. Postpartum anemia       Procedures: 1. Repeat  (LTCS)  - 1 layer closure with bilateral total salpingectomy     Hospital Course  See the completed operative report for details regarding antepartum course and delivery.    Her post-operative course was unremarkable.  On POD # 3 she felt like she ready ready for D/C.  She was evaluated by Dr. Pollard who agreed she was able to be discharged to home.  She had no febrile morbidity. She had normal bowel and bladder function and was hemodynamically stable.  Her wound was healing well without obvious signs of infections.    Infant  female  fetus weighing 2614 g (5 lb 12.2 oz)   Apgars -  8  @ 1 minute /  9  @ 5 minutes.    Discharge labs  Lab Results   Component Value Date    WBC 12.30 (H) 2018    HGB 10.0 (L) 2018    HCT 30.7 (L) 2018     2018       Discharge Medications   Lyssa Javier   Home Medication Instructions JOSE:[id number]    Printed on:18 0721   Medication Information                      ferrous sulfate 325 (65 FE) MG tablet  Take 1 tablet by mouth Daily With Breakfast.             HYDROcodone-acetaminophen (NORCO) 5-325 MG per tablet  Take 1-2 tablets by mouth Every 4 (Four) Hours As Needed for Moderate Pain .             ibuprofen (ADVIL,MOTRIN) 600 MG tablet  Take 1 tablet by mouth Every 6 (Six) Hours As Needed for Mild Pain .             Prenatal Vit-Fe Fumarate-FA (PRENATAL VITAMIN 27-0.8) 27-0.8 MG tablet tablet  Take  by mouth Daily.                 Discharge Disposition Home or Self  Care   Condition on Discharge: good   Follow-up: 2 weeks with Dr. Latrice Pollard MD  2/7/2018

## 2018-02-07 NOTE — PLAN OF CARE
Problem: Patient Care Overview (Adult)  Goal: Plan of Care Review  Outcome: Outcome(s) achieved Date Met: 18 0834   Coping/Psychosocial Response Interventions   Plan Of Care Reviewed With patient   Patient Care Overview   Progress improving     Goal: Adult Individualization and Mutuality  Outcome: Outcome(s) achieved Date Met: 18    Goal: Discharge Needs Assessment  Outcome: Outcome(s) achieved Date Met: 18      Problem: Postpartum ( Delivery) (Adult)  Goal: Signs and Symptoms of Listed Potential Problems Will be Absent or Manageable (Postpartum)  Outcome: Outcome(s) achieved Date Met: 18

## 2018-02-07 NOTE — PROGRESS NOTES
[facility]  [facility] 1995  MRN: [id number]  CSN: [id number]    Post-operative Day #3  Subjective   Her pain is well controlled. Vaginal bleeding is normal in amount. She is ambulating without difficulty. She is passing gas. She is voiding without difficulty. Her baby is doing well. She wants to go home today.     Objective     Min/max vitals past 24 hours:   Temp  Min: 97.5 °F (36.4 °C)  Max: 98 °F (36.7 °C)  BP  Min: 127/70  Max: 139/76  Pulse  Min: 70  Max: 79  Resp  Min: 16  Max: 18        General: well developed; well nourished  no acute distress   Abdomen: soft, non-tender; no masses  no umbilical or inginual hernias are present  no hepato-splenomegaly  incision is clean, dry and intact  fundus firm and non-tender   Pelvic: Not performed   Ext: Calves NT       Results from last 7 days  Lab Units 18  0810 18  0328   WBC 10*3/mm3 12.30* 9.89   HEMOGLOBIN g/dL 10.0* 11.5   HEMATOCRIT % 30.7* 34.7   PLATELETS 10*3/mm3 238 245     Lab Results   Component Value Date    RH Positive 2018    HEPBSAG Negative 2017        Assessment   1. POD #3 S/P Repeat  (LTCS) with bilateral total salpingectomy  2. Postpartum anemia     Plan   1. Remove staples and place steristrips  2. Discharge to home  3. Advised no tampons or intercourse for 6 weeks.  4. D/C questions all answered  5. Follow-up appointment in 2 month(s)  6. Explained need for oral iron for 3 months    Roe Pollard MD  2018  7:16 AM

## 2018-02-07 NOTE — PLAN OF CARE
Problem: Patient Care Overview (Adult)  Goal: Plan of Care Review  Outcome: Ongoing (interventions implemented as appropriate)   18 0414   Coping/Psychosocial Response Interventions   Plan Of Care Reviewed With patient   Patient Care Overview   Progress improving   Outcome Evaluation   Outcome Summary/Follow up Plan VSS. Pain controlled with meds. Breastpumping well and feeding baby. Progressing towards D/C.     Goal: Adult Individualization and Mutuality  Outcome: Ongoing (interventions implemented as appropriate)    Goal: Discharge Needs Assessment  Outcome: Ongoing (interventions implemented as appropriate)      Problem: Postpartum ( Delivery) (Adult)  Goal: Signs and Symptoms of Listed Potential Problems Will be Absent or Manageable (Postpartum)  Outcome: Ongoing (interventions implemented as appropriate)

## 2018-02-21 ENCOUNTER — OFFICE VISIT (OUTPATIENT)
Dept: OBSTETRICS AND GYNECOLOGY | Facility: CLINIC | Age: 23
End: 2018-02-21

## 2018-02-21 VITALS
BODY MASS INDEX: 34.72 KG/M2 | RESPIRATION RATE: 14 BRPM | SYSTOLIC BLOOD PRESSURE: 118 MMHG | WEIGHT: 196 LBS | DIASTOLIC BLOOD PRESSURE: 72 MMHG

## 2018-02-21 DIAGNOSIS — Z98.890 POST-OPERATIVE STATE: Primary | ICD-10-CM

## 2018-02-21 PROCEDURE — 99024 POSTOP FOLLOW-UP VISIT: CPT | Performed by: OBSTETRICS & GYNECOLOGY

## 2018-02-21 NOTE — PROGRESS NOTES
Subjective   Chief Complaint   Patient presents with   • Post-op     Lyssa Javier is a 22 y.o. year old  presenting to be seen for her post-operative visit.  Currently she reports no problems with eating, bowel movements, voiding, or wound drainage and pain is well controlled.    The pathology results from her procedure are in Lyssa's record and are benign.      OTHER THINGS SHE WANTS TO DISCUSS TODAY:  Nothing else    The following portions of the patient's history were reviewed and updated as appropriate:current medications and allergies       Objective   /72  Resp 14  Wt 88.9 kg (196 lb)  LMP 2017 (Approximate)  Breastfeeding? Yes  BMI 34.72 kg/m2    General:  well developed; well nourished  no acute distress   Abdomen: soft, non-tender; no masses  no umbilical or inginual hernias are present  no hepato-splenomegaly  incision is healed   Pelvis: Not performed.          Assessment   1. S/P  and bilateral salpingectomy  2. Anemia - not taking the iron     Plan   1. Avoid tampons, douching and intercourse  2. OK to drive  3. OK to lift  4. The importance of keeping all planned follow-up and taking all medications as prescribed was emphasized.  5. Follow up for postpartum visit 1 month.       This note was electronically signed.    Roe Pollard M.D.  2018    Note: Speech recognition transcription software may have been used to create portions of this document.  An attempt at proofreading has been made but errors in transcription could still be present.

## 2018-02-26 ENCOUNTER — APPOINTMENT (OUTPATIENT)
Dept: PREADMISSION TESTING | Facility: HOSPITAL | Age: 23
End: 2018-02-26

## (undated) DEVICE — PROXIMATE RH ROTATING HEAD SKIN STAPLERS (35 WIDE) CONTAINS 35 STAINLESS STEEL STAPLES: Brand: PROXIMATE

## (undated) DEVICE — SOL IRR H2O BTL 1000ML STRL

## (undated) DEVICE — SOL IRR NACL 0.9PCT BT 1000ML

## (undated) DEVICE — GLV SURG BIOGEL LTX PF 7 1/2

## (undated) DEVICE — TRY SPINE BLCK WHITACRE 25G 3X5IN

## (undated) DEVICE — MAT PREVALON MOBL TRANSFR AIR WO/PAD 39X80IN

## (undated) DEVICE — SUT GUT PLN 0 STD TIE 54IN S104H

## (undated) DEVICE — SUT GUT CHRM 1 CTX 36IN 905H

## (undated) DEVICE — PK C/SECT 10